# Patient Record
Sex: FEMALE | Race: WHITE | NOT HISPANIC OR LATINO | Employment: PART TIME | URBAN - METROPOLITAN AREA
[De-identification: names, ages, dates, MRNs, and addresses within clinical notes are randomized per-mention and may not be internally consistent; named-entity substitution may affect disease eponyms.]

---

## 2017-07-12 ENCOUNTER — TELEPHONE (OUTPATIENT)
Dept: FAMILY MEDICINE | Facility: CLINIC | Age: 42
End: 2017-07-12

## 2017-07-12 NOTE — TELEPHONE ENCOUNTER
[facility] phone call message- general phone call:    Reason for call: Called patient to make an appointment for BP check unable to reach patient at number provided. [facility] that [facility] also [facility].     Return call needed: Yes    OK to leave a message on voice mail? Yes    Primary language: English      needed? No    Call taken on July 12, 2017 at 9:25 AM by Jolynn Alcantar

## 2017-12-19 ENCOUNTER — TELEPHONE (OUTPATIENT)
Dept: FAMILY MEDICINE | Facility: CLINIC | Age: 42
End: 2017-12-19

## 2017-12-19 NOTE — TELEPHONE ENCOUNTER
Diabetes Panel Management: due for f/u dm and recheck BP. BP elevated on 12/13/16 at 150/96. Has not been seen since 12/2016.    Again, called patient today. Phone number is not accepting calls from Clinic number, unable to leave message. Automated phone message states to call a 1-800 # for customer services and to call from a land line. No further phone call attempt. Diabetes outreach letter mailed to patient today.    THERESA Sandhu

## 2017-12-19 NOTE — LETTER
December 19, 2017      Ms. Alla Good  [address]  [place] [facility] [address]        Dear Alla,    As you know when you have diabetes it important to visit your doctor 3 times a year for diabetes care.  You are due for a diabetes follow-up visit.  This visit is important but not urgent. Please call the appointment desk the clinic to schedule a visit with your primary doctor in the next few weeks.     When you come for your appointment please bring your medications and your blood sugar log with you so we can make the best use of the visit.      Sincerely,    Misbah Palla, MD

## 2018-01-27 ENCOUNTER — HEALTH MAINTENANCE LETTER (OUTPATIENT)
Age: 43
End: 2018-01-27

## 2018-06-24 ENCOUNTER — OFFICE VISIT (OUTPATIENT)
Dept: URGENT CARE | Facility: URGENT CARE | Age: 43
End: 2018-06-24
Payer: COMMERCIAL

## 2018-06-24 ENCOUNTER — NURSE TRIAGE (OUTPATIENT)
Dept: NURSING | Facility: CLINIC | Age: 43
End: 2018-06-24

## 2018-06-24 VITALS
TEMPERATURE: 98.4 F | OXYGEN SATURATION: 98 % | WEIGHT: 225 LBS | BODY MASS INDEX: 36.32 KG/M2 | SYSTOLIC BLOOD PRESSURE: 104 MMHG | HEART RATE: 80 BPM | DIASTOLIC BLOOD PRESSURE: 71 MMHG

## 2018-06-24 DIAGNOSIS — R11.2 INTRACTABLE VOMITING WITH NAUSEA, UNSPECIFIED VOMITING TYPE: Primary | ICD-10-CM

## 2018-06-24 PROCEDURE — 99213 OFFICE O/P EST LOW 20 MIN: CPT | Performed by: PEDIATRICS

## 2018-06-24 RX ORDER — ONDANSETRON 4 MG/1
4 TABLET, FILM COATED ORAL EVERY 8 HOURS PRN
Qty: 18 TABLET | Refills: 0 | Status: SHIPPED | OUTPATIENT
Start: 2018-06-24
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
## 2018-06-24 NOTE — TELEPHONE ENCOUNTER
Patient asking what her co-pay will be if she is seen at .  States she will have to borrow money from someone and take bus.  Crying as states she will lose job.  Transferred to backline at  for assistance.

## 2018-06-24 NOTE — MR AVS SNAPSHOT
After Visit Summary   6/24/2018    Alla Good    MRN: [id number]           Patient Information     Date Of Birth          1975        Visit Information        Provider Department      6/24/2018 3:15 PM Stephen Dalal MD [facility]        Today's Diagnoses     Intractable vomiting with nausea, unspecified vomiting type    -  1       Follow-ups after your visit        Who to contact     If you have questions or need follow up information about today's clinic visit or your schedule please contact [facility] directly at [phone].  Normal or non-critical lab and imaging results will be communicated to you by MyChart, letter or phone within 4 business days after the clinic has received the results. If you do not hear from us within 7 days, please contact the clinic through MyChart or phone. If you have a critical or abnormal lab result, we will notify you by phone as soon as possible.  Submit refill requests through [employer] or call your pharmacy and they will forward the refill request to us. Please allow 3 business days for your refill to be completed.          Additional Information About Your Visit        Care EveryWhere ID     This is your Care EveryWhere ID. This could be used by other organizations to access your [place] medical records  [id number]        Your Vitals Were     Pulse Temperature Pulse Oximetry BMI (Body Mass Index)          80 98.4  F (36.9  C) (Tympanic) 98% 36.32 kg/m2         Blood Pressure from Last 3 Encounters:   06/24/18 104/71   12/13/16 (!) 150/96   06/24/16 127/77    Weight from Last 3 Encounters:   06/24/18 225 lb (102.1 kg)   12/13/16 219 lb (99.3 kg)   06/24/16 229 lb 12.8 oz (104.2 kg)              Today, you had the following     No orders found for display         Today's Medication Changes          These changes are accurate as of 6/24/18  3:47 PM.  If you have any questions, ask your nurse or doctor.                Start taking these medicines.        Dose/Directions    ondansetron 4 MG tablet   Commonly known as:  ZOFRAN   Used for:  Intractable vomiting with nausea, unspecified vomiting type   Started by:  Stephen Dalal MD        Dose:  4 mg   Take 1 tablet (4 mg) by mouth every 8 hours as needed for nausea   Quantity:  18 tablet   Refills:  0            Where to get your medicines      These medications were sent to [id number] - [place], [place] - [address] AT [facility] [address]  [address], [place] [address]     Phone:  [phone]     ondansetron 4 MG tablet                Primary Care Provider Office Phone # Fax #    Milo Yousuf Palla, [phone]       [address] [address]        Equal Access to Services     [facility] CenterGRAYSON : Hadii bea mack hadasho Soomaali, waaxda luqadaha, qaybta kaalmada adeegyada, marguerite hernandez hayilir arriaza . So [facility] [phone].    ATENCIÓN: Si habla español, tiene a decker disposición servicios gratuitos de asistencia lingüística. [facility] [phone].    We comply with applicable federal civil rights laws and [place] laws. We do not discriminate on the basis of race, color, national origin, age, disability, sex, sexual orientation, or gender identity.            Thank you!     Thank you for choosing [facility]  for your care. Our goal is always to provide you with excellent care. Hearing back from our patients is one way we can continue to improve our services. Please take a few minutes to complete the written survey that you may receive in the mail after your visit with us. Thank you!             Your Updated Medication List - Protect others around you: Learn how to safely use, store and throw away your medicines at www.disposemymeds.org.          This list is accurate as of 6/24/18  3:47 PM.  Always use your most recent med list.                   Brand Name Dispense  Instructions for use Diagnosis    blood glucose monitoring lancets     1 Box    3Use to test blood sugar 3 times daily or as directed.    Type II or unspecified type diabetes mellitus with ophthalmic manifestations, not stated as uncontrolled(250.50) (H)       blood glucose monitoring meter device kit      Use as directed        blood glucose monitoring test strip    JACKY CONTOUR NEXT    30 strip    Use to test blood sugar 3 times daily or as directed.    Type II or unspecified type diabetes mellitus with ophthalmic manifestations, not stated as uncontrolled(250.50) (H)       docusate sodium 100 MG tablet    COLACE    60 tablet    Take 200 mg by mouth daily    Hemorrhagic ovarian cyst       doxepin 100 MG capsule    SINEquan     Take 1 capsule (100 mg) by mouth At Bedtime        ibuprofen 600 MG tablet    ADVIL/MOTRIN    90 tablet    Take 1 tablet (600 mg) by mouth every 6 hours as needed for moderate pain    Right elbow pain, Contusion of right elbow, initial encounter       KLONOPIN PO      Take 1 mg by mouth 2x a day as needed        LANCETS MISC.      Lancets for Didi glucose machine        ondansetron 4 MG tablet    ZOFRAN    18 tablet    Take 1 tablet (4 mg) by mouth every 8 hours as needed for nausea    Intractable vomiting with nausea, unspecified vomiting type       prazosin 2 MG capsule    MINIPRESS    60 capsule    Take 1 capsule (2 mg) by mouth At Bedtime        SUMAtriptan 25 MG tablet    IMITREX    9 tablet    Take 1-2 tablets (25-50 mg) by mouth at onset of headache for migraine May repeat dose in 2 hours.  Do not exceed 200 mg in 24 hours    Migraine with aura and without status migrainosus, not intractable

## 2020-11-10 ENCOUNTER — COMMUNICATION - HEALTHEAST (OUTPATIENT)
Dept: SCHEDULING | Facility: CLINIC | Age: 45
End: 2020-11-10

## 2020-11-11 ENCOUNTER — COMMUNICATION - HEALTHEAST (OUTPATIENT)
Dept: SCHEDULING | Facility: CLINIC | Age: 45
End: 2020-11-11

## 2021-05-25 ENCOUNTER — RECORDS - HEALTHEAST (OUTPATIENT)
Dept: ADMINISTRATIVE | Facility: CLINIC | Age: 46
End: 2021-05-25

## 2021-05-26 ENCOUNTER — RECORDS - HEALTHEAST (OUTPATIENT)
Dept: ADMINISTRATIVE | Facility: CLINIC | Age: 46
End: 2021-05-26

## 2021-05-28 ENCOUNTER — RECORDS - HEALTHEAST (OUTPATIENT)
Dept: ADMINISTRATIVE | Facility: CLINIC | Age: 46
End: 2021-05-28

## 2021-05-29 ENCOUNTER — RECORDS - HEALTHEAST (OUTPATIENT)
Dept: ADMINISTRATIVE | Facility: CLINIC | Age: 46
End: 2021-05-29

## 2021-05-30 ENCOUNTER — RECORDS - HEALTHEAST (OUTPATIENT)
Dept: ADMINISTRATIVE | Facility: CLINIC | Age: 46
End: 2021-05-30

## 2021-05-31 ENCOUNTER — RECORDS - HEALTHEAST (OUTPATIENT)
Dept: ADMINISTRATIVE | Facility: CLINIC | Age: 46
End: 2021-05-31

## 2021-06-02 ENCOUNTER — RECORDS - HEALTHEAST (OUTPATIENT)
Dept: ADMINISTRATIVE | Facility: CLINIC | Age: 46
End: 2021-06-02

## 2021-06-02 ENCOUNTER — HOSPITAL ENCOUNTER (EMERGENCY)
Dept: EMERGENCY MEDICINE | Facility: CLINIC | Age: 46
Discharge: HOME | End: 2021-06-02
Payer: COMMERCIAL

## 2021-06-02 DIAGNOSIS — R11.2 NAUSEA VOMITING AND DIARRHEA: ICD-10-CM

## 2021-06-02 DIAGNOSIS — R10.31 RLQ ABDOMINAL PAIN: ICD-10-CM

## 2021-06-02 DIAGNOSIS — R19.7 NAUSEA VOMITING AND DIARRHEA: ICD-10-CM

## 2021-06-02 LAB
ALBUMIN SERPL-MCNC: 3.5 G/DL (ref 3.5–5)
ALP SERPL-CCNC: 75 U/L (ref 45–120)
ALT SERPL W P-5'-P-CCNC: 16 U/L (ref 0–45)
ANION GAP SERPL CALCULATED.3IONS-SCNC: 8 MMOL/L (ref 5–18)
AST SERPL W P-5'-P-CCNC: 10 U/L (ref 0–40)
BASOPHILS # BLD AUTO: 0 THOU/UL (ref 0–0.2)
BASOPHILS NFR BLD AUTO: 1 % (ref 0–2)
BILIRUB DIRECT SERPL-MCNC: <0.1 MG/DL
BILIRUB SERPL-MCNC: 0.2 MG/DL (ref 0–1)
BUN SERPL-MCNC: 6 MG/DL (ref 8–22)
CALCIUM SERPL-MCNC: 8.7 MG/DL (ref 8.5–10.5)
CHLORIDE BLD-SCNC: 107 MMOL/L (ref 98–107)
CO2 SERPL-SCNC: 25 MMOL/L (ref 22–31)
CREAT SERPL-MCNC: 0.79 MG/DL (ref 0.6–1.1)
EOSINOPHIL # BLD AUTO: 0.2 THOU/UL (ref 0–0.4)
EOSINOPHIL NFR BLD AUTO: 3 % (ref 0–6)
ERYTHROCYTE [DISTWIDTH] IN BLOOD BY AUTOMATED COUNT: 12.7 % (ref 11–14.5)
GFR SERPL CREATININE-BSD FRML MDRD: >60 ML/MIN/1.73M2
GLUCOSE BLD-MCNC: 164 MG/DL (ref 70–125)
HCT VFR BLD AUTO: 39 % (ref 35–47)
HGB BLD-MCNC: 13.1 G/DL (ref 12–16)
IMM GRANULOCYTES # BLD: 0 THOU/UL
IMM GRANULOCYTES NFR BLD: 0 %
LIPASE SERPL-CCNC: 28 U/L (ref 0–52)
LYMPHOCYTES # BLD AUTO: 2.5 THOU/UL (ref 0.8–4.4)
LYMPHOCYTES NFR BLD AUTO: 32 % (ref 20–40)
MCH RBC QN AUTO: 29.6 PG (ref 27–34)
MCHC RBC AUTO-ENTMCNC: 33.6 G/DL (ref 32–36)
MCV RBC AUTO: 88 FL (ref 80–100)
MONOCYTES # BLD AUTO: 0.5 THOU/UL (ref 0–0.9)
MONOCYTES NFR BLD AUTO: 7 % (ref 2–10)
NEUTROPHILS # BLD AUTO: 4.6 THOU/UL (ref 2–7.7)
NEUTROPHILS NFR BLD AUTO: 58 % (ref 50–70)
PLATELET # BLD AUTO: 332 THOU/UL (ref 140–440)
PMV BLD AUTO: 8.5 FL (ref 8.5–12.5)
POC PREG URINE (HCG) HE - HISTORICAL: NEGATIVE
POCT KIT EXPIRATION DATE HE - HISTORICAL: NORMAL
POCT KIT LOT NUMBER HE - HISTORICAL: NORMAL
POCT NEGATIVE CONTROL HE - HISTORICAL: NORMAL
POCT POSITIVE CONTROL HE - HISTORICAL: NORMAL
POTASSIUM BLD-SCNC: 3.6 MMOL/L (ref 3.5–5)
PROT SERPL-MCNC: 6.6 G/DL (ref 6–8)
RBC # BLD AUTO: 4.43 MILL/UL (ref 3.8–5.4)
SODIUM SERPL-SCNC: 140 MMOL/L (ref 136–145)
WBC: 7.9 THOU/UL (ref 4–11)

## 2021-06-02 ASSESSMENT — MIFFLIN-ST. JEOR: SCORE: 1778.94

## 2021-06-07 ENCOUNTER — HOSPITAL ENCOUNTER (EMERGENCY)
Dept: EMERGENCY MEDICINE | Facility: CLINIC | Age: 46
Discharge: HOME | End: 2021-06-07
Attending: EMERGENCY MEDICINE
Payer: COMMERCIAL

## 2021-06-07 DIAGNOSIS — R10.31 RIGHT LOWER QUADRANT ABDOMINAL PAIN: ICD-10-CM

## 2021-06-07 LAB
ALBUMIN SERPL-MCNC: 4 G/DL (ref 3.5–5)
ALBUMIN UR-MCNC: ABNORMAL G/DL
ALP SERPL-CCNC: 65 U/L (ref 45–120)
ALT SERPL W P-5'-P-CCNC: 23 U/L (ref 0–45)
ANION GAP SERPL CALCULATED.3IONS-SCNC: 6 MMOL/L (ref 5–18)
APPEARANCE UR: CLEAR
AST SERPL W P-5'-P-CCNC: 20 U/L (ref 0–40)
BACTERIA #/AREA URNS HPF: ABNORMAL /[HPF]
BILIRUB DIRECT SERPL-MCNC: 0.2 MG/DL
BILIRUB SERPL-MCNC: 0.5 MG/DL (ref 0–1)
BILIRUB UR QL STRIP: NEGATIVE
BUN SERPL-MCNC: 11 MG/DL (ref 8–22)
CALCIUM SERPL-MCNC: 9.8 MG/DL (ref 8.5–10.5)
CHLORIDE BLD-SCNC: 104 MMOL/L (ref 98–107)
CO2 SERPL-SCNC: 29 MMOL/L (ref 22–31)
COLOR UR AUTO: YELLOW
CREAT SERPL-MCNC: 0.8 MG/DL (ref 0.6–1.1)
ERYTHROCYTE [DISTWIDTH] IN BLOOD BY AUTOMATED COUNT: 12.5 % (ref 11–14.5)
GFR SERPL CREATININE-BSD FRML MDRD: >60 ML/MIN/1.73M2
GLUCOSE BLD-MCNC: 88 MG/DL (ref 70–125)
GLUCOSE UR STRIP-MCNC: NEGATIVE MG/DL
HCT VFR BLD AUTO: 41.3 % (ref 35–47)
HGB BLD-MCNC: 13.8 G/DL (ref 12–16)
HGB UR QL STRIP: NEGATIVE
KETONES UR STRIP-MCNC: NEGATIVE MG/DL
LACTATE SERPL-SCNC: 1.4 MMOL/L (ref 0.7–2)
LEUKOCYTE ESTERASE UR QL STRIP: NEGATIVE
MCH RBC QN AUTO: 29.3 PG (ref 27–34)
MCHC RBC AUTO-ENTMCNC: 33.4 G/DL (ref 32–36)
MCV RBC AUTO: 88 FL (ref 80–100)
MUCOUS THREADS #/AREA URNS LPF: PRESENT LPF
NITRATE UR QL: NEGATIVE
PH UR STRIP: 6 [PH] (ref 5–8)
PLATELET # BLD AUTO: 345 THOU/UL (ref 140–440)
PMV BLD AUTO: 8.3 FL (ref 8.5–12.5)
POTASSIUM BLD-SCNC: 3.8 MMOL/L (ref 3.5–5)
PROT SERPL-MCNC: 7 G/DL (ref 6–8)
RBC # BLD AUTO: 4.71 MILL/UL (ref 3.8–5.4)
RBC URINE: 3 HPF
SODIUM SERPL-SCNC: 139 MMOL/L (ref 136–145)
SP GR UR STRIP: 1.03 (ref 1–1.03)
SQUAMOUS EPITHELIAL: 2 /HPF
UROBILINOGEN UR STRIP-ACNC: ABNORMAL
WBC URINE: 3 HPF
WBC: 7.7 THOU/UL (ref 4–11)

## 2021-06-07 ASSESSMENT — MIFFLIN-ST. JEOR: SCORE: 1851.52

## 2021-06-25 NOTE — ED TRIAGE NOTES
Patient is here with RLQ pain, she still has her appendix, she also has a hx of ovarian cyst. She also started to have emesis with diarrhea.

## 2021-06-25 NOTE — ED TRIAGE NOTES
abd pain with n/v and some intermitt diarrhea x 5 days  Seen here in ED last week for same symptoms, referred to follow up with pmd-seen by pmd today for continued rt sided abd pain and referred to ED for further workup

## 2021-06-26 NOTE — ED PROVIDER NOTES
EMERGENCY DEPARTMENT ENCOUNTER      NAME: Alla Good  AGE: 46 y.o. female  YOB: 1975  MRN: [id number]  EVALUATION DATE & TIME: 2021  1:55 PM    PCP: Coretta Griggs PA-C    ED PROVIDER: Mac Jones M.D.      Chief Complaint   Patient presents with     Abdominal Pain         FINAL IMPRESSION:  Right lower quadrant pain      ED COURSE & MEDICAL DECISION MAKIN:12 PM I met with patient for initial interview and exam. I saw the patient wearing a surgical mask and eye protection.   3:29 PM.  Laboratory evaluation is normal.  Normal white cell count, normal lactic acid.  Urine analysis with minimal trace hematuria.  Awaiting CT imaging.   4:40 PM.  CT imaging is unremarkable.  No findings to explain patient's symptomatology.  Laboratory evaluation also normal.  Given the unilateral location and burning quality the possibility of zoster without the rash considered.  Given the extent of her symptoms will treat empirically with antivirals and a prednisone taper.  Patient is agreeable.  Pertinent Labs & Imaging studies reviewed. (See chart for details)  46 y.o. female presents to the Emergency Department for evaluation of worsening abdominal pain.  Patient reports a severe achiness in the right lower quadrant.  Symptoms slowly worsening for the last 4 to 5 days.  At the conclusion of the encounter I discussed the results of all of the tests and the disposition. The questions were answered. The patient or family acknowledged understanding and was agreeable with the care plan.  Patient had been seen on  for similar symptomatology.  Laboratory evaluation and imaging was unremarkable.  Given her worsening symptoms patient contact her physician today who recommended reevaluation in the emergency room.  She also reports ongoing vomiting and diarrhea.  On exam she is an obese female in mild distress.  Signs are unremarkable.  Heart lungs are unremarkable.  Abdomen is obese, soft and hypoactive.  She  has mild right upper quadrant and marked right lower quadrant tenderness.  Primary concern is of potential right-sided diverticulitis versus enteritis versus appendicitis.  Less likely represent ovarian cystic disease given ongoing symptoms.  Labs are sent for evaluation and repeat CT imaging ordered.  Patient treated symptomatically with intravenous Zofran and Dilaudid.    Current Discharge Medication List      START taking these medications    Details   acyclovir (ZOVIRAX) 400 MG tablet Take 1 tablet (400 mg total) by mouth 4 (four) times a day for 10 days.  Qty: 50 tablet, Refills: 0    Associated Diagnoses: Right lower quadrant abdominal pain      !! HYDROcodone-acetaminophen 5-325 mg per tablet Take 1 tablet by mouth every 4 (four) hours as needed for pain.  Qty: 20 tablet, Refills: 0    Associated Diagnoses: Right lower quadrant abdominal pain      ondansetron (ZOFRAN ODT) 4 MG disintegrating tablet Take 1 tablet (4 mg total) by mouth every 8 (eight) hours as needed for nausea.  Qty: 12 tablet, Refills: 0    Associated Diagnoses: Right lower quadrant abdominal pain      predniSONE (DELTASONE) 10 mg tablet Take 4 tabs daily for 3 days, then 3 tabs daily for 3 days, then 2 tabs daily for 3 days, then 1 tab daily for 3 days, then stop..  Qty: 30 tablet, Refills: 0    Associated Diagnoses: Right lower quadrant abdominal pain       !! - Potential duplicate medications found. Please discuss with provider.        MEDICATIONS GIVEN IN THE EMERGENCY:  Medications - No data to display    NEW PRESCRIPTIONS STARTED AT TODAY'S ER VISIT  Current Discharge Medication List      CONTINUE these medications which have NOT CHANGED    Details   aspirin 81 MG EC tablet       benzonatate (TESSALON) 100 MG capsule Take 1 capsule (100 mg total) by mouth every 8 (eight) hours.  Qty: 15 capsule, Refills: 0      cetirizine-pseudoephedrine (ZYRTEC-D) 5-120 mg per tablet Take 1 tablet by mouth 2 (two) times a day.  Qty: 10 tablet, Refills:  0      clonazePAM (KLONOPIN) 1 MG tablet Take 1 mg by mouth.      doxepin (SINEQUAN) 25 MG capsule Take 75 mg by mouth.      fluticasone (FLONASE) 50 mcg/actuation nasal spray 2 sprays into each nostril daily.  Qty: 16 g, Refills: 0      HYDROcodone-acetaminophen 5-325 mg per tablet Take 1 tablet by mouth every 4 (four) hours as needed for pain.  Qty: 6 tablet, Refills: 0    Associated Diagnoses: Cyst of right ovary; Right lower quadrant abdominal pain      lisinopril (PRINIVIL,ZESTRIL) 10 MG tablet Take 10 mg by mouth.      metFORMIN (GLUCOPHAGE) 500 MG tablet Take 1,000 mg by mouth.      metoclopramide (REGLAN) 10 MG tablet Take 10 mg by mouth.      omeprazole (PRILOSEC) 40 MG capsule Take 40 mg by mouth.      ondansetron (ZOFRAN) 4 MG tablet Take 1 tablet (4 mg total) by mouth every 6 (six) hours.  Qty: 12 tablet, Refills: 0    Associated Diagnoses: Nausea vomiting and diarrhea      prazosin (MINIPRESS) 1 MG capsule Take 2 mg by mouth.      !! SUMAtriptan (IMITREX) 100 MG tablet Take 0.5 tablets (50 mg total) by mouth every 2 (two) hours as needed for migraine.  Qty: 10 tablet, Refills: 0      !! SUMAtriptan (IMITREX) 25 MG tablet        !! - Potential duplicate medications found. Please discuss with provider.             =================================================================    HPI    Patient information was obtained from: Patient    Use of : N/A         Alla Good is a 46 y.o. female with a pertinent history of depression, anxiety, panic disorder, schizoaffective disorder, obesity, high blood pressure, carotid artery stenosis, type II diabetes mellitus, and ovarian cysts  who presents to this ED via walk in for evaluation of abdominal pain.     Per chart review, patient was seen here at the [facility] ED on 6/2/21 for evaluation of RLQ abdominal pain, nausea, vomiting, and diarrhea.  UPT negative. CT negative. Working diagnosis of the abdominal pain was unclear. Possibly could be  "gastroenteritis. Pain was improved with dilaudid and zofran. Patient discharged home with encouragement of home supportive management and close follow up in clinic the following day.    Patient reports that her right lower quadrant abdominal pain has worsened over the past 2-3 days, with pain becoming significantly worse last night. Pain is worsened with movements such as ambulation and she describes pain as feeling \"on fire\". She reports that she is unable to keep anything down and endorses emesis and diarrhea. Patient denies any dysuria, urinary frequency, vaginal discharge, or additional urinary symptoms. No recent changes to diet.     Patient still has her appendix and gallbladder. She had her left ovary removed in 2015, and states that the right was not removed as it looked healthy at the time.      REVIEW OF SYSTEMS   Review of Systems   Gastrointestinal: Positive for abdominal pain (right lower quadrant), diarrhea, nausea and vomiting.   Genitourinary: Negative for dysuria, frequency, hematuria and vaginal discharge.   All other systems reviewed and are negative.      PAST MEDICAL HISTORY:  Past Medical History:   Diagnosis Date     Anxiety      Carotid artery stenosis      Depression      Diabetes mellitus, type 2 (H)      Migraine      PTSD (post-traumatic stress disorder)      Schizoaffective disorder (H)        PAST SURGICAL HISTORY:  Past Surgical History:   Procedure Laterality Date     HYSTERECTOMY         CURRENT MEDICATIONS:    No current facility-administered medications on file prior to encounter.      Current Outpatient Medications on File Prior to Encounter   Medication Sig     aspirin 81 MG EC tablet      benzonatate (TESSALON) 100 MG capsule Take 1 capsule (100 mg total) by mouth every 8 (eight) hours.     cetirizine-pseudoephedrine (ZYRTEC-D) 5-120 mg per tablet Take 1 tablet by mouth 2 (two) times a day.     clonazePAM (KLONOPIN) 1 MG tablet Take 1 mg by mouth.     doxepin (SINEQUAN) 25 MG " capsule Take 75 mg by mouth.     fluticasone (FLONASE) 50 mcg/actuation nasal spray 2 sprays into each nostril daily.     HYDROcodone-acetaminophen 5-325 mg per tablet Take 1 tablet by mouth every 4 (four) hours as needed for pain.     lisinopril (PRINIVIL,ZESTRIL) 10 MG tablet Take 10 mg by mouth.     metFORMIN (GLUCOPHAGE) 500 MG tablet Take 1,000 mg by mouth.     metoclopramide (REGLAN) 10 MG tablet Take 10 mg by mouth.     omeprazole (PRILOSEC) 40 MG capsule Take 40 mg by mouth.     ondansetron (ZOFRAN) 4 MG tablet Take 1 tablet (4 mg total) by mouth every 6 (six) hours.     prazosin (MINIPRESS) 1 MG capsule Take 2 mg by mouth.     SUMAtriptan (IMITREX) 100 MG tablet Take 0.5 tablets (50 mg total) by mouth every 2 (two) hours as needed for migraine.     SUMAtriptan (IMITREX) 25 MG tablet        ALLERGIES:  Allergies   Allergen Reactions     Demerol [Meperidine]      Lamictal [Lamotrigine]      Sulfa (Sulfonamide Antibiotics)      Codeine Itching       FAMILY HISTORY:  No family history on file.    SOCIAL HISTORY:   Social History     Socioeconomic History     Marital status: Single     Spouse name: Not on file     Number of children: Not on file     Years of education: Not on file     Highest education level: Not on file   Occupational History     Not on file   Social Needs     Financial resource strain: Not on file     Food insecurity     Worry: Not on file     Inability: Not on file     Transportation needs     Medical: Not on file     Non-medical: Not on file   Tobacco Use     Smoking status: Current Every Day Smoker   Substance and Sexual Activity     Alcohol use: No     Drug use: No     Sexual activity: Not on file   Lifestyle     Physical activity     Days per week: Not on file     Minutes per session: Not on file     Stress: Not on file   Relationships     Social connections     Talks on phone: Not on file     Gets together: Not on file     Attends Mandaeism service: Not on file     Active member of club  "or organization: Not on file     Attends meetings of clubs or organizations: Not on file     Relationship status: Not on file     Intimate partner violence     Fear of current or ex partner: Not on file     Emotionally abused: Not on file     Physically abused: Not on file     Forced sexual activity: Not on file   Other Topics Concern     Not on file   Social History Narrative     Not on file       VITALS:  Patient Vitals for the past 24 hrs:   BP Temp Temp src Pulse Resp SpO2 Height Weight   06/07/21 1415 -- -- -- 88 -- 100 % -- --   06/07/21 1144 (!) 142/104 98.2  F (36.8  C) Oral 81 20 99 % 5' 7\" (1.702 m) (!) 261 lb (118.4 kg)       PHYSICAL EXAM    GENERAL: Obese female. Awake, alert.  Moderate distress.   HEENT: Normocephalic, atraumatic.  Pupils equal, round and reactive.  Conjunctiva normal.  EOMI.  NECK: No stridor.  PULMONARY: Symmetrical breath sounds without distress.  Lungs clear to auscultation bilaterally without wheezes, rhonchi or rales.  CARDIO: Regular rate and rhythm.  No significant murmur, rub or gallop.  Radial pulses strong and symmetrical.  ABDOMINAL: Obese, Abdomen soft, hypoactive bowel sounds, mild right upper quadrant moderate to severe right lower quadrant tenderness with guarding. Non-distended  No CVAT, BL.  EXTREMITIES: No lower extremity swelling or edema.    NEURO: Alert and oriented to person, place and time.  Cranial nerves grossly intact.  No focal motor deficit.  PSYCH: Normal mood and affect  SKIN: No rashes      LAB:  All pertinent labs reviewed and interpreted.  Results for orders placed or performed during the hospital encounter of 06/07/21   Urinalysis-UC if Indicated   Result Value Ref Range    Color, UA Yellow Light Yellow, Yellow    Clarity, UA Clear Clear    Glucose, UA Negative Negative    Protein, UA 10 mg/dL Negative    Bilirubin, UA Negative Negative    Urobilinogen, UA <2.0 mg/dL <2.0 mg/dL    pH, UA 6.0 5.0 - 8.0    Blood, UA Negative Negative    Ketones, UA " Negative Negative    Nitrite, UA Negative Negative    Leukocytes, UA Negative Negative    Specific Gravity, UA 1.027 1.001 - 1.030    RBC, UA 3 (H) <=2 hpf    WBC UA 3 <=5 hpf    Bacteria, UA Few (!) None Seen    Squamous Epithel, UA 2 <=5 /HPF    Mucus, UA Present (!) None Seen lpf   Basic Metabolic Panel   Result Value Ref Range    Sodium 139 136 - 145 mmol/L    Potassium 3.8 3.5 - 5.0 mmol/L    Chloride 104 98 - 107 mmol/L    CO2 29 22 - 31 mmol/L    Anion Gap, Calculation 6 5 - 18 mmol/L    Glucose 88 70 - 125 mg/dL    Calcium 9.8 8.5 - 10.5 mg/dL    BUN 11 8 - 22 mg/dL    Creatinine 0.80 0.60 - 1.10 mg/dL    GFR MDRD Af Amer >60 >60 mL/min/1.73m2    GFR MDRD Non Af Amer >60 >60 mL/min/1.73m2   Hepatic Profile   Result Value Ref Range    Bilirubin, Total 0.5 0.0 - 1.0 mg/dL    Bilirubin, Direct 0.2 <=0.5 mg/dL    Protein, Total 7.0 6.0 - 8.0 g/dL    Albumin 4.0 3.5 - 5.0 g/dL    Alkaline Phosphatase 65 45 - 120 U/L    AST 20 0 - 40 U/L    ALT 23 0 - 45 U/L   Lactic Acid   Result Value Ref Range    Lactic Acid 1.4 0.7 - 2.0 mmol/L   HM2 (CBC W/O DIFF)   Result Value Ref Range    WBC 7.7 4.0 - 11.0 thou/uL    RBC 4.71 3.80 - 5.40 mill/uL    Hemoglobin 13.8 12.0 - 16.0 g/dL    Hematocrit 41.3 35.0 - 47.0 %    MCV 88 80 - 100 fL    MCH 29.3 27.0 - 34.0 pg    MCHC 33.4 32.0 - 36.0 g/dL    RDW 12.5 11.0 - 14.5 %    Platelets 345 140 - 440 thou/uL    MPV 8.3 (L) 8.5 - 12.5 fL       RADIOLOGY:  Reviewed all pertinent imaging. Please see official radiology report.  Ct Abdomen Pelvis Without Oral Without Iv Contrast    Result Date: 6/7/2021  EXAM: CT ABDOMEN PELVIS WO ORAL WO IV CONTRAST LOCATION: [facility] DATE/TIME: 6/7/2021 3:47 PM INDICATION: Worsening right lower quadrant tenderness COMPARISON: 6/2/2021 and 3/17/2020 CT TECHNIQUE: CT scan of the abdomen and pelvis was performed without oral or IV contrast. Multiplanar reformats were obtained. Dose reduction techniques were used. CONTRAST:  None. FINDINGS: LOWER CHEST: Normal. HEPATOBILIARY: Normal. PANCREAS: Normal. SPLEEN: Normal. ADRENAL GLANDS: Normal. KIDNEY/BLADDER: Right mid renal wedge-shaped cortical defect with a 1 mm calcification at its base could represent a nonobstructive stone. No ureteral stone or hydronephrosis. BOWEL: Normal-appearing colon and small bowel. Appendix not visualized. LYMPH NODES: Normal. VASCULATURE: Unremarkable. PELVIC ORGANS: Absent uterus. MUSCULOSKELETAL: Stable appearing sclerotic lesions both iliac bones, left sacrum, and both symphysis. These should be benign bone islands.     1.  Stable right renal 1 mm calcification at the base of a renal cortical scar. 2.  No right ureteral stone or hydronephrosis. 3.  No right abdominal pain etiology found.          I, Marisa Sim, am serving as a scribe to document services personally performed by Dr. Mac Jones MD, based on my observation and the provider's statements to me. I, Mac Jones MD attest that Marisa Sim is acting in a scribe capacity, has observed my performance of the services and has documented them in accordance with my direction.    Mac Jones M.D.  Emergency Medicine  [facility] EMERGENCY ROOM  [address] [address]  Dept: [phone]  Loc: [phone]       Mac Jones MD  06/07/21 [id number]

## 2021-06-26 NOTE — ED PROVIDER NOTES
EMERGENCY DEPARTMENT ENCOUNTER      NAME: Alla Good  AGE: 46 y.o. female  YOB: 1975  MRN: [id number]  EVALUATION DATE & TIME: 2021  7:25 PM    PCP: Coretta Griggs PA-C    ED PROVIDER: Tiffanie Hoffman PA-C      Chief Complaint   Patient presents with     Abdominal Pain         FINAL IMPRESSION:  1. RLQ abdominal pain    2. Nausea vomiting and diarrhea          ED COURSE & MEDICAL DECISION MAKIN:38 PM I met with the patient for the initial interview and physical examination. Discussed plan for treatment and workup in the ED.    9:15 PM I updated patient with lab and imaging results.   10:33 PM I checked in with patient. Discussed plan for discharge.    Alla is a 46 year old female with PMH ovarian cysts s/p left oopherectomy who presents to the ED for evaluation of RLQ abdominal pain, nausea, vomiting, and diarrhea. No fevers, dysuria, vaginal bleeding, or vaginal discharge. Patient still has her appendix and gallbladder. She has tried ibuprofen and tylenol without relief.     MEDICAL DECISION MAKING: I was concerned about this patient's LOWER ABDOMINAL pain and considered multiple causes including but not limited to the following.          Intestinal Ischemia:  Patient does not have significant risk factors for intestinal ischemia.  Pain is not out of proportion to exam findings.    Transverse colitis / Diverticulitis: Patient does have diarrhea, No fevers or leukocytosis. CT is negative..     Appendicitis: Patient does have RLQ TTP.  No fever, leukocytosis, and CT negative.    Pyelonephritis: Urine is not consistent with infection    Ureterolithiasis/Renal Colic:  Urine does not show blood. No hydronephrosis or ureterolithiasis on CT.     Bowel Obstruction:  Patient is passing gas. Bowel sounds are normal. CT is negative.    Ectopic Pregnancy: Patient is a 46 y.o. female.  UPT is negative    Ovarian cyst/mass with Torsion: Patient is a 46 y.o. female.  Does have a history of cysts.  Ultrasound with no evidence of right ovarian cyst and normal flow demonstrated, low suspicion for torsion.     PID, Tuboovarian abscess:  No new vaginal discharge.      Others benign causes including: IBS, menstrual cramps, ovarian cyst rupture, intestinal colic / gas pains, etc      IMPRESSION/DISPOSITION: Based on this patient's history, exam, and diagnostic results, the working diagnosis of this patient's RLQ abdominal pain is unclear. Could be gastroenteritis vs ruptured ovarian cyst (although no notable free fluid on ultrasound). Work-up is reassuring. Pain is improved with dilaudid and zofran. Discussed results with patient. Encouraged at home supportive management and close follow up in clinic tomorrow for re-check. Instructed on red flags/indications to return to the emergency department. All questions were answered to the best of my ability and patient is agreeable with plan.             MEDICATIONS GIVEN IN THE EMERGENCY:  Medications   HYDROmorphone injection 0.5 mg (DILAUDID) (0.5 mg Intravenous Given 6/2/21 1952)   ondansetron injection 4 mg (ZOFRAN) (4 mg Intravenous Given 6/2/21 1949)   iopamidol solution 100 mL (ISOVUE-370) (100 mL Intravenous Given 6/2/21 2046)   HYDROmorphone injection 0.5 mg (DILAUDID) (0.5 mg Intravenous Given 6/2/21 2109)   ondansetron injection 4 mg (ZOFRAN) (4 mg Intravenous Given 6/2/21 2108)       NEW PRESCRIPTIONS STARTED AT TODAY'S ER VISIT  Discharge Medication List as of 6/2/2021 10:49 PM      CONTINUE these medications which have CHANGED    Details   ondansetron (ZOFRAN) 4 MG tablet Take 1 tablet (4 mg total) by mouth every 6 (six) hours., Starting Wed 6/2/2021, Print         CONTINUE these medications which have NOT CHANGED    Details   aspirin 81 MG EC tablet Starting 1/20/2015, Until Discontinued, Historical Med      benzonatate (TESSALON) 100 MG capsule Take 1 capsule (100 mg total) by mouth every 8 (eight) hours., Starting Sat 12/15/2018, Print       cetirizine-pseudoephedrine (ZYRTEC-D) 5-120 mg per tablet Take 1 tablet by mouth 2 (two) times a day., Starting Sat 12/15/2018, Print      clonazePAM (KLONOPIN) 1 MG tablet Take 1 mg by mouth., Until Discontinued, Historical Med      doxepin (SINEQUAN) 25 MG capsule Take 75 mg by mouth., Until Discontinued, Historical Med      fluticasone (FLONASE) 50 mcg/actuation nasal spray 2 sprays into each nostril daily., Starting Sat 12/15/2018, Print      HYDROcodone-acetaminophen 5-325 mg per tablet Take 1 tablet by mouth every 4 (four) hours as needed for pain., Starting Sat 3/14/2020, Print      lisinopril (PRINIVIL,ZESTRIL) 10 MG tablet Take 10 mg by mouth., Until Discontinued, Historical Med      metFORMIN (GLUCOPHAGE) 500 MG tablet Take 1,000 mg by mouth., Starting 3/22/2011, Until Discontinued, Historical Med      metoclopramide (REGLAN) 10 MG tablet Take 10 mg by mouth., Starting 12/9/2015, Until Discontinued, Historical Med      omeprazole (PRILOSEC) 40 MG capsule Take 40 mg by mouth., Until Discontinued, Historical Med      prazosin (MINIPRESS) 1 MG capsule Take 2 mg by mouth., Until Discontinued, Historical Med      !! SUMAtriptan (IMITREX) 100 MG tablet Take 0.5 tablets (50 mg total) by mouth every 2 (two) hours as needed for migraine., Starting 10/8/2016, Until Discontinued, Print      !! SUMAtriptan (IMITREX) 25 MG tablet Starting 2/17/2015, Until Discontinued, Historical Med       !! - Potential duplicate medications found. Please discuss with provider.             =================================================================    HPI    Patient information was obtained from: patient    Use of Intrepreter: N/A         Alla Good is a 46 y.o. female with a pertinent history of depression, anxiety, panic disorder, schizoaffective disorder, obesity, high blood pressure, carotid artery stenosis, type II diabetes mellitus, and ovarian cysts who presents to this ED via walk in for evaluation of lower right  abdominal pain.    Per chart review, patient was seen at [facility] Emergency Department on 11/8/20 for the evaluation of nausea, vomiting, and diarrhea. Patient was treated with ondansetron.     Per chart review, patient was seen at [facility] Emergency Department via EMS on 9/21/20 for the evaluation of weakness. Patient was treated with cephalexin for UTI.     Yesterday (6/1/21), the patient had a sudden onset of persistent right lower abdominal pain and since then her pain has worsened. The patient typically takes ibuprofen and tylenol for her ovarian cysts, and she has been taking these without relief. She reports diarrhea, nausea, and vomiting. The patient denies any urinary symptoms, vaginal bleeding, or vaginal discharge.    The patient has a history of ovarian cysts and reports that the pain is similar but worse and the vomiting and diarrhea is not concurrent with past symptoms. She notes one episode of ovarian torsion. She reports that her right ovary, appendix, and gallbladder are in place.     The patent noted to be fully vaccinated against COVID-19.    REVIEW OF SYSTEMS   Review of Systems   Gastrointestinal: Positive for abdominal pain (right lower quadrant), diarrhea, nausea and vomiting.   Genitourinary: Negative for difficulty urinating, dyspareunia, dysuria, enuresis, vaginal bleeding and vaginal discharge.   All other systems reviewed and are negative.         PAST MEDICAL HISTORY:  Past Medical History:   Diagnosis Date     Anxiety      Carotid artery stenosis      Depression      Diabetes mellitus, type 2 (H)      Migraine      PTSD (post-traumatic stress disorder)      Schizoaffective disorder (H)        PAST SURGICAL HISTORY:  Past Surgical History:   Procedure Laterality Date     HYSTERECTOMY             CURRENT MEDICATIONS:    No current facility-administered medications on file prior to encounter.      Current Outpatient Medications on File Prior to Encounter   Medication  Sig     aspirin 81 MG EC tablet      benzonatate (TESSALON) 100 MG capsule Take 1 capsule (100 mg total) by mouth every 8 (eight) hours.     cetirizine-pseudoephedrine (ZYRTEC-D) 5-120 mg per tablet Take 1 tablet by mouth 2 (two) times a day.     clonazePAM (KLONOPIN) 1 MG tablet Take 1 mg by mouth.     doxepin (SINEQUAN) 25 MG capsule Take 75 mg by mouth.     fluticasone (FLONASE) 50 mcg/actuation nasal spray 2 sprays into each nostril daily.     HYDROcodone-acetaminophen 5-325 mg per tablet Take 1 tablet by mouth every 4 (four) hours as needed for pain.     lisinopril (PRINIVIL,ZESTRIL) 10 MG tablet Take 10 mg by mouth.     metFORMIN (GLUCOPHAGE) 500 MG tablet Take 1,000 mg by mouth.     metoclopramide (REGLAN) 10 MG tablet Take 10 mg by mouth.     omeprazole (PRILOSEC) 40 MG capsule Take 40 mg by mouth.     prazosin (MINIPRESS) 1 MG capsule Take 2 mg by mouth.     SUMAtriptan (IMITREX) 100 MG tablet Take 0.5 tablets (50 mg total) by mouth every 2 (two) hours as needed for migraine.     SUMAtriptan (IMITREX) 25 MG tablet      [DISCONTINUED] ondansetron (ZOFRAN) 4 MG tablet Take 1-2 tablets (4-8 mg total) by mouth every 8 (eight) hours as needed for nausea.       ALLERGIES:  Allergies   Allergen Reactions     Demerol [Meperidine]      Lamictal [Lamotrigine]      Sulfa (Sulfonamide Antibiotics)      Codeine Itching       FAMILY HISTORY:  No family history on file.    SOCIAL HISTORY:   Social History     Socioeconomic History     Marital status: Single     Spouse name: Not on file     Number of children: Not on file     Years of education: Not on file     Highest education level: Not on file   Occupational History     Not on file   Social Needs     Financial resource strain: Not on file     Food insecurity     Worry: Not on file     Inability: Not on file     Transportation needs     Medical: Not on file     Non-medical: Not on file   Tobacco Use     Smoking status: Current Every Day Smoker   Substance and Sexual  "Activity     Alcohol use: No     Drug use: No     Sexual activity: Not on file   Lifestyle     Physical activity     Days per week: Not on file     Minutes per session: Not on file     Stress: Not on file   Relationships     Social connections     Talks on phone: Not on file     Gets together: Not on file     Attends Sabianist service: Not on file     Active member of club or organization: Not on file     Attends meetings of clubs or organizations: Not on file     Relationship status: Not on file     Intimate partner violence     Fear of current or ex partner: Not on file     Emotionally abused: Not on file     Physically abused: Not on file     Forced sexual activity: Not on file   Other Topics Concern     Not on file   Social History Narrative     Not on file       VITALS:  Patient Vitals for the past 24 hrs:   BP Temp Temp src Pulse Resp SpO2 Height Weight   06/02/21 2115 138/61 -- -- 75 -- 99 % -- --   06/02/21 2105 147/67 -- -- 75 -- 99 % -- --   06/02/21 2031 180/90 -- -- 79 -- 98 % -- --   06/02/21 2015 159/70 -- -- 78 -- 98 % -- --   06/02/21 1948 149/83 -- -- 82 -- 99 % -- --   06/02/21 1923 162/79 (!) 96  F (35.6  C) Temporal 72 18 98 % 5' 7\" (1.702 m) (!) 245 lb (111.1 kg)       PHYSICAL EXAM    Constitutional:  Well developed, well nourished, no acute distress  EYES: Conjunctivae clear  HENT:  Atraumatic, normocephalic. External ears normal, nose normal, oropharynx moist and clear. Neck- supple   Respiratory:  No respiratory distress, normal breath sounds, no rales, no wheezing   Cardiovascular:  Normal rate, normal rhythm, no murmurs.   GI:  Soft, nondistended, right lower quandrant tenderness, no rebounding or guarding  : No CVA tenderness  Musculoskeletal:  No deformities. Moves all extremities equally.  Integument: Warm, Dry, No erythema, No rash.   Neurologic:  Alert & oriented x 3, no focal deficits noted, ambulatory   Psych: Affect normal, Judgment normal, Mood normal.       LAB:  All pertinent " labs reviewed and interpreted.  Results for orders placed or performed during the hospital encounter of 06/02/21   Basic Metabolic Panel   Result Value Ref Range    Sodium 140 136 - 145 mmol/L    Potassium 3.6 3.5 - 5.0 mmol/L    Chloride 107 98 - 107 mmol/L    CO2 25 22 - 31 mmol/L    Anion Gap, Calculation 8 5 - 18 mmol/L    Glucose 164 (H) 70 - 125 mg/dL    Calcium 8.7 8.5 - 10.5 mg/dL    BUN 6 (L) 8 - 22 mg/dL    Creatinine 0.79 0.60 - 1.10 mg/dL    GFR MDRD Af Amer >60 >60 mL/min/1.73m2    GFR MDRD Non Af Amer >60 >60 mL/min/1.73m2   Hepatic Profile   Result Value Ref Range    Bilirubin, Total 0.2 0.0 - 1.0 mg/dL    Bilirubin, Direct <0.1 <=0.5 mg/dL    Protein, Total 6.6 6.0 - 8.0 g/dL    Albumin 3.5 3.5 - 5.0 g/dL    Alkaline Phosphatase 75 45 - 120 U/L    AST 10 0 - 40 U/L    ALT 16 0 - 45 U/L   Lipase   Result Value Ref Range    Lipase 28 0 - 52 U/L   Urinalysis-UC if Indicated   Result Value Ref Range    Color, UA Yellow Light Yellow, Yellow    Clarity, UA Clear Clear    Glucose, UA Negative Negative    Protein, UA 20 mg/dL Negative    Bilirubin, UA Negative Negative    Urobilinogen, UA <2.0 mg/dL <2.0 mg/dL    pH, UA 6.5 5.0 - 8.0    Blood, UA Negative Negative    Ketones, UA Negative Negative    Nitrite, UA Negative Negative    Leukocytes, UA Negative Negative    Specific Gravity, UA 1.032 (H) 1.001 - 1.030    RBC, UA 1 <=2 hpf    WBC UA 3 <=5 hpf    Bacteria, UA None Seen None Seen    Squamous Epithel, UA 4 <=5 /HPF    Mucus, UA Present (!) None Seen lpf    Hyaline Casts, UA 3 <=5 lpf   HM1 (CBC with Diff)   Result Value Ref Range    WBC 7.9 4.0 - 11.0 thou/uL    RBC 4.43 3.80 - 5.40 mill/uL    Hemoglobin 13.1 12.0 - 16.0 g/dL    Hematocrit 39.0 35.0 - 47.0 %    MCV 88 80 - 100 fL    MCH 29.6 27.0 - 34.0 pg    MCHC 33.6 32.0 - 36.0 g/dL    RDW 12.7 11.0 - 14.5 %    Platelets 332 140 - 440 thou/uL    MPV 8.5 8.5 - 12.5 fL    Neutrophils % 58 50 - 70 %    Lymphocytes % 32 20 - 40 %    Monocytes % 7 2 - 10  %    Eosinophils % 3 0 - 6 %    Basophils % 1 0 - 2 %    Immature Granulocyte % 0 <=0 %    Neutrophils Absolute 4.6 2.0 - 7.7 thou/uL    Lymphocytes Absolute 2.5 0.8 - 4.4 thou/uL    Monocytes Absolute 0.5 0.0 - 0.9 thou/uL    Eosinophils Absolute 0.2 0.0 - 0.4 thou/uL    Basophils Absolute 0.0 0.0 - 0.2 thou/uL    Immature Granulocyte Absolute 0.0 <=0.0 thou/uL   POCT pregnancy, urine   Result Value Ref Range    POC Preg, Urine Negative Negative    POCT Kit Lot Number [id number]     POCT Kit Expiration Date 12/2022     Pos Control Valid Control Valid Control    Neg Control Valid Control Valid Control       RADIOLOGY:  Reviewed all pertinent imaging. Please see official radiology report.  Us Pelvis With Transvaginal Non Ob    Result Date: 6/2/2021  EXAM: US PELVIS WITH TRANSVAGINAL NON OB LOCATION: [facility] DATE/TIME: 6/2/2021 9:38 PM INDICATION: RLQ abdominal pain, history of cysts COMPARISON: CT 06/02/2021 TECHNIQUE: Transabdominal scans were performed. Endovaginal ultrasound was performed to better visualize the adnexa. FINDINGS: UTERUS: Surgically absent RIGHT OVARY: 3.4 x 1.7 x 2.3 cm. Normal flow demonstrated. There are couple of normal-appearing follicles. No dominant cyst. LEFT OVARY: Not identified possibly surgically absent. No significant free fluid.     1.  Negative pelvic ultrasound.     Ct Abdomen Pelvis Without Oral With Iv Contrast    Result Date: 6/2/2021  EXAM: CT ABDOMEN PELVIS WO ORAL W IV CONTRAST LOCATION: [facility] DATE/TIME: 6/2/2021 8:54 PM INDICATION: Abdominal pain, acute, nonlocalized RLQ abdominal pain COMPARISON: None. TECHNIQUE: CT scan of the abdomen and pelvis was performed following injection of IV contrast. Multiplanar reformats were obtained. Dose reduction techniques were used. CONTRAST: Iopamidol (Isovue-370) 100mL FINDINGS: LOWER CHEST: Normal. HEPATOBILIARY: Normal. PANCREAS: Normal. SPLEEN: Normal. ADRENAL GLANDS: Normal.  KIDNEYS/BLADDER: Solitary 1 mm nonobstructing stone mid right kidney. No hydronephrosis. Mild parenchymal scarring right kidney likely from previous infection. BOWEL: Normal. LYMPH NODES: Normal. VASCULATURE: Unremarkable. PELVIC ORGANS: Postop change. Otherwise negative. MUSCULOSKELETAL: Benign bone islands in the pelvis unchanged.     1.  No acute findings to explain the patient's pain. 2.  Tiny 1 mm nonobstructing stone mid right kidney.      EKG:    None    PROCEDURES:   None      I, Leann Russ, am serving as a scribe to document services personally performed by Tiffanie Hoffman PA-C based on my observation and the provider's statements to me. I, Tiffanie Hoffman PA-C attest that Leann Russ is acting in a scribe capacity, has observed my performance of the services and has documented them in accordance with my direction.    Tiffanie Hoffman PA-C  Emergency Medicine  [facility] EMERGENCY ROOM  [address] [facility] [address]  Dept: [phone]  Loc: [phone]      Tiffanie Hoffman PA-C  06/02/21 0820

## 2021-07-06 VITALS — WEIGHT: 245 LBS | BODY MASS INDEX: 38.45 KG/M2 | HEIGHT: 67 IN

## 2021-07-06 VITALS — HEIGHT: 67 IN | BODY MASS INDEX: 40.97 KG/M2 | WEIGHT: 261 LBS

## 2021-08-25 ENCOUNTER — HOSPITAL ENCOUNTER (EMERGENCY)
Facility: CLINIC | Age: 46
Discharge: HOME | End: 2021-08-26
Attending: STUDENT IN AN ORGANIZED HEALTH CARE EDUCATION/TRAINING PROGRAM | Admitting: STUDENT IN AN ORGANIZED HEALTH CARE EDUCATION/TRAINING PROGRAM
Payer: COMMERCIAL

## 2021-08-25 DIAGNOSIS — R11.2 NAUSEA AND VOMITING, INTRACTABILITY OF VOMITING NOT SPECIFIED, UNSPECIFIED VOMITING TYPE: ICD-10-CM

## 2021-08-25 DIAGNOSIS — N83.8 MASS OF RIGHT OVARY: ICD-10-CM

## 2021-08-25 LAB
ALBUMIN SERPL-MCNC: 3.7 G/DL (ref 3.5–5)
ALP SERPL-CCNC: 69 U/L (ref 45–120)
ALT SERPL W P-5'-P-CCNC: 17 U/L (ref 0–45)
ANION GAP SERPL CALCULATED.3IONS-SCNC: 9 MMOL/L (ref 5–18)
AST SERPL W P-5'-P-CCNC: 10 U/L (ref 0–40)
BASOPHILS # BLD AUTO: 0 10E3/UL (ref 0–0.2)
BASOPHILS NFR BLD AUTO: 1 %
BILIRUB DIRECT SERPL-MCNC: <0.1 MG/DL
BILIRUB SERPL-MCNC: 0.2 MG/DL (ref 0–1)
BUN SERPL-MCNC: 9 MG/DL (ref 8–22)
CALCIUM SERPL-MCNC: 9.8 MG/DL (ref 8.5–10.5)
CHLORIDE BLD-SCNC: 102 MMOL/L (ref 98–107)
CO2 SERPL-SCNC: 27 MMOL/L (ref 22–31)
CREAT SERPL-MCNC: 0.8 MG/DL (ref 0.6–1.1)
EOSINOPHIL # BLD AUTO: 0.1 10E3/UL (ref 0–0.7)
EOSINOPHIL NFR BLD AUTO: 2 %
ERYTHROCYTE [DISTWIDTH] IN BLOOD BY AUTOMATED COUNT: 12.6 % (ref 10–15)
GFR SERPL CREATININE-BSD FRML MDRD: 88 ML/MIN/1.73M2
GLUCOSE BLD-MCNC: 167 MG/DL (ref 70–125)
HCT VFR BLD AUTO: 39 % (ref 35–47)
HGB BLD-MCNC: 13.1 G/DL (ref 11.7–15.7)
IMM GRANULOCYTES # BLD: 0.1 10E3/UL
IMM GRANULOCYTES NFR BLD: 1 %
LIPASE SERPL-CCNC: 12 U/L (ref 0–52)
LYMPHOCYTES # BLD AUTO: 2 10E3/UL (ref 0.8–5.3)
LYMPHOCYTES NFR BLD AUTO: 24 %
MCH RBC QN AUTO: 29.2 PG (ref 26.5–33)
MCHC RBC AUTO-ENTMCNC: 33.6 G/DL (ref 31.5–36.5)
MCV RBC AUTO: 87 FL (ref 78–100)
MONOCYTES # BLD AUTO: 0.5 10E3/UL (ref 0–1.3)
MONOCYTES NFR BLD AUTO: 6 %
NEUTROPHILS # BLD AUTO: 5.6 10E3/UL (ref 1.6–8.3)
NEUTROPHILS NFR BLD AUTO: 66 %
NRBC # BLD AUTO: 0 10E3/UL
NRBC BLD AUTO-RTO: 0 /100
PLATELET # BLD AUTO: 335 10E3/UL (ref 150–450)
POTASSIUM BLD-SCNC: 4 MMOL/L (ref 3.5–5)
PROT SERPL-MCNC: 7.1 G/DL (ref 6–8)
RBC # BLD AUTO: 4.48 10E6/UL (ref 3.8–5.2)
SODIUM SERPL-SCNC: 138 MMOL/L (ref 136–145)
WBC # BLD AUTO: 8.4 10E3/UL (ref 4–11)

## 2021-08-25 PROCEDURE — 96375 TX/PRO/DX INJ NEW DRUG ADDON: CPT

## 2021-08-25 PROCEDURE — 85025 COMPLETE CBC W/AUTO DIFF WBC: CPT | Performed by: STUDENT IN AN ORGANIZED HEALTH CARE EDUCATION/TRAINING PROGRAM

## 2021-08-25 PROCEDURE — 99285 EMERGENCY DEPT VISIT HI MDM: CPT | Mod: 25

## 2021-08-25 PROCEDURE — 82310 ASSAY OF CALCIUM: CPT | Performed by: EMERGENCY MEDICINE

## 2021-08-25 PROCEDURE — 82248 BILIRUBIN DIRECT: CPT | Performed by: EMERGENCY MEDICINE

## 2021-08-25 PROCEDURE — 96374 THER/PROPH/DIAG INJ IV PUSH: CPT | Mod: 59

## 2021-08-25 PROCEDURE — 85004 AUTOMATED DIFF WBC COUNT: CPT | Performed by: EMERGENCY MEDICINE

## 2021-08-25 PROCEDURE — 250N000011 HC RX IP 250 OP 636: Performed by: EMERGENCY MEDICINE

## 2021-08-25 PROCEDURE — 258N000003 HC RX IP 258 OP 636: Performed by: EMERGENCY MEDICINE

## 2021-08-25 PROCEDURE — 83690 ASSAY OF LIPASE: CPT | Performed by: EMERGENCY MEDICINE

## 2021-08-25 PROCEDURE — 83690 ASSAY OF LIPASE: CPT | Performed by: STUDENT IN AN ORGANIZED HEALTH CARE EDUCATION/TRAINING PROGRAM

## 2021-08-25 PROCEDURE — C9113 INJ PANTOPRAZOLE SODIUM, VIA: HCPCS | Performed by: EMERGENCY MEDICINE

## 2021-08-25 PROCEDURE — 82248 BILIRUBIN DIRECT: CPT | Performed by: STUDENT IN AN ORGANIZED HEALTH CARE EDUCATION/TRAINING PROGRAM

## 2021-08-25 PROCEDURE — 96361 HYDRATE IV INFUSION ADD-ON: CPT

## 2021-08-25 PROCEDURE — 36415 COLL VENOUS BLD VENIPUNCTURE: CPT | Performed by: EMERGENCY MEDICINE

## 2021-08-25 RX ORDER — ONDANSETRON 4 MG/1
4 TABLET, ORALLY DISINTEGRATING ORAL ONCE
Status: COMPLETED | OUTPATIENT
Start: 2021-08-25 | End: 2021-08-25

## 2021-08-25 RX ADMIN — PANTOPRAZOLE SODIUM 40 MG: 40 INJECTION, POWDER, FOR SOLUTION INTRAVENOUS at 21:28

## 2021-08-25 RX ADMIN — ONDANSETRON 4 MG: 4 TABLET, ORALLY DISINTEGRATING ORAL at 18:53

## 2021-08-25 RX ADMIN — SODIUM CHLORIDE 1000 ML: 9 INJECTION, SOLUTION INTRAVENOUS at 18:59

## 2021-08-25 ASSESSMENT — MIFFLIN-ST. JEOR: SCORE: 1801.62

## 2021-08-25 NOTE — Clinical Note
Alla Good was seen and treated in our emergency department on 8/25/2021.  She may return to work on 08/27/2021.       If you have any questions or concerns, please don't hesitate to call.      Marques James MD

## 2021-08-25 NOTE — ED TRIAGE NOTES
"The patient presents to the ED with c/o hematemesis that started today around 1400. Patient has been having \"stomach issues\". Had a endoscopy with [facility] earlier this month that showed \"a nasty case of GERD\". Feeling nauseated. Denies any ETOH use.  "

## 2021-08-26 ENCOUNTER — APPOINTMENT (OUTPATIENT)
Dept: ULTRASOUND IMAGING | Facility: CLINIC | Age: 46
End: 2021-08-26
Attending: STUDENT IN AN ORGANIZED HEALTH CARE EDUCATION/TRAINING PROGRAM
Payer: COMMERCIAL

## 2021-08-26 ENCOUNTER — APPOINTMENT (OUTPATIENT)
Dept: CT IMAGING | Facility: CLINIC | Age: 46
End: 2021-08-26
Attending: STUDENT IN AN ORGANIZED HEALTH CARE EDUCATION/TRAINING PROGRAM
Payer: COMMERCIAL

## 2021-08-26 VITALS
SYSTOLIC BLOOD PRESSURE: 115 MMHG | OXYGEN SATURATION: 98 % | RESPIRATION RATE: 20 BRPM | HEIGHT: 67 IN | DIASTOLIC BLOOD PRESSURE: 61 MMHG | WEIGHT: 250 LBS | HEART RATE: 84 BPM | TEMPERATURE: 98.3 F | BODY MASS INDEX: 39.24 KG/M2

## 2021-08-26 PROCEDURE — 250N000013 HC RX MED GY IP 250 OP 250 PS 637: Performed by: STUDENT IN AN ORGANIZED HEALTH CARE EDUCATION/TRAINING PROGRAM

## 2021-08-26 PROCEDURE — 76830 TRANSVAGINAL US NON-OB: CPT

## 2021-08-26 PROCEDURE — 250N000011 HC RX IP 250 OP 636: Performed by: STUDENT IN AN ORGANIZED HEALTH CARE EDUCATION/TRAINING PROGRAM

## 2021-08-26 PROCEDURE — 74177 CT ABD & PELVIS W/CONTRAST: CPT

## 2021-08-26 PROCEDURE — 258N000003 HC RX IP 258 OP 636: Performed by: STUDENT IN AN ORGANIZED HEALTH CARE EDUCATION/TRAINING PROGRAM

## 2021-08-26 RX ORDER — MORPHINE SULFATE 4 MG/ML
4 INJECTION, SOLUTION INTRAMUSCULAR; INTRAVENOUS ONCE
Status: COMPLETED | OUTPATIENT
Start: 2021-08-26 | End: 2021-08-26

## 2021-08-26 RX ORDER — ONDANSETRON 4 MG/1
4 TABLET, ORALLY DISINTEGRATING ORAL EVERY 6 HOURS PRN
Qty: 8 TABLET | Refills: 0 | Status: SHIPPED | OUTPATIENT
Start: 2021-08-26

## 2021-08-26 RX ORDER — PROCHLORPERAZINE MALEATE 10 MG
10 TABLET ORAL ONCE
Status: COMPLETED | OUTPATIENT
Start: 2021-08-26 | End: 2021-08-26

## 2021-08-26 RX ORDER — IOPAMIDOL 755 MG/ML
100 INJECTION, SOLUTION INTRAVASCULAR ONCE
Status: COMPLETED | OUTPATIENT
Start: 2021-08-26 | End: 2021-08-26

## 2021-08-26 RX ORDER — DIPHENHYDRAMINE HYDROCHLORIDE 50 MG/ML
25 INJECTION INTRAMUSCULAR; INTRAVENOUS ONCE
Status: COMPLETED | OUTPATIENT
Start: 2021-08-26 | End: 2021-08-26

## 2021-08-26 RX ORDER — METOCLOPRAMIDE HYDROCHLORIDE 5 MG/ML
10 INJECTION INTRAMUSCULAR; INTRAVENOUS ONCE
Status: COMPLETED | OUTPATIENT
Start: 2021-08-26 | End: 2021-08-26

## 2021-08-26 RX ADMIN — SODIUM CHLORIDE, POTASSIUM CHLORIDE, SODIUM LACTATE AND CALCIUM CHLORIDE 2000 ML: 600; 310; 30; 20 INJECTION, SOLUTION INTRAVENOUS at 00:07

## 2021-08-26 RX ADMIN — IOPAMIDOL 100 ML: 755 INJECTION, SOLUTION INTRAVENOUS at 00:34

## 2021-08-26 RX ADMIN — METOCLOPRAMIDE HYDROCHLORIDE 10 MG: 5 INJECTION INTRAMUSCULAR; INTRAVENOUS at 00:07

## 2021-08-26 RX ADMIN — MORPHINE SULFATE 4 MG: 4 INJECTION, SOLUTION INTRAMUSCULAR; INTRAVENOUS at 00:07

## 2021-08-26 RX ADMIN — PROCHLORPERAZINE MALEATE 10 MG: 10 TABLET ORAL at 03:51

## 2021-08-26 RX ADMIN — DIPHENHYDRAMINE HYDROCHLORIDE 25 MG: 50 INJECTION INTRAMUSCULAR; INTRAVENOUS at 00:07
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
## 2021-09-07 ENCOUNTER — TRANSFERRED RECORDS (OUTPATIENT)
Dept: HEALTH INFORMATION MANAGEMENT | Facility: CLINIC | Age: 46
End: 2021-09-07

## 2021-09-07 ENCOUNTER — MEDICAL CORRESPONDENCE (OUTPATIENT)
Dept: HEALTH INFORMATION MANAGEMENT | Facility: CLINIC | Age: 46
End: 2021-09-07

## 2022-12-01 ENCOUNTER — MEDICAL CORRESPONDENCE (OUTPATIENT)
Dept: HEALTH INFORMATION MANAGEMENT | Facility: CLINIC | Age: 47
End: 2022-12-01

## 2022-12-01 ENCOUNTER — TRANSFERRED RECORDS (OUTPATIENT)
Dept: HEALTH INFORMATION MANAGEMENT | Facility: CLINIC | Age: 47
End: 2022-12-01

## 2022-12-01 LAB
ALT SERPL-CCNC: 25 U/L (ref 6–29)
AST SERPL-CCNC: 14 U/L (ref 10–35)
CREATININE (EXTERNAL): 0.65 MG/DL (ref 0.5–0.99)
GFR ESTIMATED (EXTERNAL): 109 ML/MIN/1.73M2
GLUCOSE (EXTERNAL): 223 MG/DL (ref 65–99)
HBA1C MFR BLD: 9 %
POTASSIUM (EXTERNAL): 4.2 MMOL/L (ref 3.5–5.3)
TSH SERPL-ACNC: 0.93 MIU/L (ref 0.4–4.5)

## 2022-12-06 ENCOUNTER — TRANSCRIBE ORDERS (OUTPATIENT)
Dept: OTHER | Age: 47
End: 2022-12-06

## 2022-12-06 DIAGNOSIS — R42 DIZZINESS: Primary | ICD-10-CM

## 2022-12-12 ENCOUNTER — APPOINTMENT (OUTPATIENT)
Dept: RADIOLOGY | Facility: CLINIC | Age: 47
End: 2022-12-12
Attending: EMERGENCY MEDICINE
Payer: COMMERCIAL

## 2022-12-12 ENCOUNTER — APPOINTMENT (OUTPATIENT)
Dept: ULTRASOUND IMAGING | Facility: CLINIC | Age: 47
End: 2022-12-12
Attending: EMERGENCY MEDICINE
Payer: COMMERCIAL

## 2022-12-12 ENCOUNTER — HOSPITAL ENCOUNTER (EMERGENCY)
Facility: CLINIC | Age: 47
Discharge: HOME | End: 2022-12-13
Attending: EMERGENCY MEDICINE | Admitting: EMERGENCY MEDICINE
Payer: COMMERCIAL

## 2022-12-12 DIAGNOSIS — S93.402A SPRAIN OF LEFT ANKLE, UNSPECIFIED LIGAMENT, INITIAL ENCOUNTER: ICD-10-CM

## 2022-12-12 LAB
BASOPHILS # BLD AUTO: 0.1 10E3/UL (ref 0–0.2)
BASOPHILS NFR BLD AUTO: 1 %
EOSINOPHIL # BLD AUTO: 0.2 10E3/UL (ref 0–0.7)
EOSINOPHIL NFR BLD AUTO: 2 %
ERYTHROCYTE [DISTWIDTH] IN BLOOD BY AUTOMATED COUNT: 12.5 % (ref 10–15)
HCT VFR BLD AUTO: 39.4 % (ref 35–47)
HGB BLD-MCNC: 13.3 G/DL (ref 11.7–15.7)
IMM GRANULOCYTES # BLD: 0 10E3/UL
IMM GRANULOCYTES NFR BLD: 1 %
LYMPHOCYTES # BLD AUTO: 3.3 10E3/UL (ref 0.8–5.3)
LYMPHOCYTES NFR BLD AUTO: 38 %
MCH RBC QN AUTO: 28.9 PG (ref 26.5–33)
MCHC RBC AUTO-ENTMCNC: 33.8 G/DL (ref 31.5–36.5)
MCV RBC AUTO: 86 FL (ref 78–100)
MONOCYTES # BLD AUTO: 0.7 10E3/UL (ref 0–1.3)
MONOCYTES NFR BLD AUTO: 8 %
NEUTROPHILS # BLD AUTO: 4.4 10E3/UL (ref 1.6–8.3)
NEUTROPHILS NFR BLD AUTO: 50 %
NRBC # BLD AUTO: 0 10E3/UL
NRBC BLD AUTO-RTO: 0 /100
PLATELET # BLD AUTO: 363 10E3/UL (ref 150–450)
RBC # BLD AUTO: 4.6 10E6/UL (ref 3.8–5.2)
WBC # BLD AUTO: 8.7 10E3/UL (ref 4–11)

## 2022-12-12 PROCEDURE — 85025 COMPLETE CBC W/AUTO DIFF WBC: CPT | Performed by: EMERGENCY MEDICINE

## 2022-12-12 PROCEDURE — 36415 COLL VENOUS BLD VENIPUNCTURE: CPT | Performed by: EMERGENCY MEDICINE

## 2022-12-12 PROCEDURE — 96374 THER/PROPH/DIAG INJ IV PUSH: CPT

## 2022-12-12 PROCEDURE — 73610 X-RAY EXAM OF ANKLE: CPT | Mod: LT

## 2022-12-12 PROCEDURE — 73630 X-RAY EXAM OF FOOT: CPT | Mod: LT

## 2022-12-12 PROCEDURE — 99285 EMERGENCY DEPT VISIT HI MDM: CPT | Mod: 25

## 2022-12-12 PROCEDURE — 93971 EXTREMITY STUDY: CPT | Mod: LT

## 2022-12-12 PROCEDURE — 80048 BASIC METABOLIC PNL TOTAL CA: CPT | Performed by: EMERGENCY MEDICINE

## 2022-12-12 RX ORDER — ACETAMINOPHEN 325 MG/1
650 TABLET ORAL ONCE
Status: COMPLETED | OUTPATIENT
Start: 2022-12-12 | End: 2022-12-13

## 2022-12-12 ASSESSMENT — ENCOUNTER SYMPTOMS
ARTHRALGIAS: 1
ABDOMINAL PAIN: 0
MYALGIAS: 1

## 2022-12-12 ASSESSMENT — ACTIVITIES OF DAILY LIVING (ADL): ADLS_ACUITY_SCORE: 35

## 2022-12-13 VITALS
RESPIRATION RATE: 18 BRPM | TEMPERATURE: 98.1 F | HEART RATE: 84 BPM | HEIGHT: 67 IN | BODY MASS INDEX: 40.81 KG/M2 | DIASTOLIC BLOOD PRESSURE: 89 MMHG | OXYGEN SATURATION: 95 % | SYSTOLIC BLOOD PRESSURE: 159 MMHG | WEIGHT: 260 LBS

## 2022-12-13 LAB
ANION GAP SERPL CALCULATED.3IONS-SCNC: 11 MMOL/L (ref 5–18)
APTT PPP: 24 SECONDS (ref 22–38)
BUN SERPL-MCNC: 11 MG/DL (ref 8–22)
CALCIUM SERPL-MCNC: 9.4 MG/DL (ref 8.5–10.5)
CHLORIDE BLD-SCNC: 103 MMOL/L (ref 98–107)
CO2 SERPL-SCNC: 23 MMOL/L (ref 22–31)
CREAT SERPL-MCNC: 0.88 MG/DL (ref 0.6–1.1)
GFR SERPL CREATININE-BSD FRML MDRD: 81 ML/MIN/1.73M2
GLUCOSE BLD-MCNC: 189 MG/DL (ref 70–125)
INR PPP: 0.99 (ref 0.85–1.15)
POTASSIUM BLD-SCNC: 3.9 MMOL/L (ref 3.5–5)
SODIUM SERPL-SCNC: 137 MMOL/L (ref 136–145)

## 2022-12-13 PROCEDURE — 85730 THROMBOPLASTIN TIME PARTIAL: CPT | Performed by: EMERGENCY MEDICINE

## 2022-12-13 PROCEDURE — 250N000011 HC RX IP 250 OP 636: Performed by: EMERGENCY MEDICINE

## 2022-12-13 PROCEDURE — 36415 COLL VENOUS BLD VENIPUNCTURE: CPT | Performed by: EMERGENCY MEDICINE

## 2022-12-13 PROCEDURE — 85610 PROTHROMBIN TIME: CPT | Performed by: EMERGENCY MEDICINE

## 2022-12-13 RX ORDER — KETOROLAC TROMETHAMINE 15 MG/ML
15 INJECTION, SOLUTION INTRAMUSCULAR; INTRAVENOUS ONCE
Status: COMPLETED | OUTPATIENT
Start: 2022-12-13 | End: 2022-12-13

## 2022-12-13 RX ADMIN — KETOROLAC TROMETHAMINE 15 MG: 15 INJECTION, SOLUTION INTRAMUSCULAR; INTRAVENOUS at 00:49

## 2022-12-13 ASSESSMENT — ACTIVITIES OF DAILY LIVING (ADL): ADLS_ACUITY_SCORE: 35
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
## 2022-12-13 NOTE — ED TRIAGE NOTES
"Pt presents to the ED with c/o left foot/ ankle pain for the past 4 days. Pt states left foot has a \"lump\" and is more red and swollen.       "

## 2022-12-13 NOTE — ED NOTES
EMERGENCY DEPARTMENT SIGN OUT NOTE        ED COURSE AND MEDICAL DECISION MAKING  Patient was signed out to me by Dr Qasim Hewitt at 11:38 PM  1:11 AM I rechecked the patient and updated them on laboratory and imaging results.    In brief, Alla Good is a 47 year old female who initially presented left ankle pain.  Imaging performed by the previous provider shows no evidence of fracture or DVT.    At time of sign out, disposition was pending lab testing.  If lab testing negative plan from the previous provider was for discharge to home.  There is no evidence of acute process on lab testing, and at this time we will discharge as originally planned by Dr. Hewitt.  Patient was comfortable with this plan.  Return precautions discussed.    FINAL IMPRESSION    1. Sprain of left ankle, unspecified ligament, initial encounter        ED MEDS  Medications   acetaminophen (TYLENOL) tablet 650 mg (650 mg Oral Not Given 12/13/22 0016)   ketorolac (TORADOL) injection 15 mg (15 mg Intravenous Given 12/13/22 0049)       LAB  Labs Ordered and Resulted from Time of ED Arrival to Time of ED Departure   BASIC METABOLIC PANEL - Abnormal       Result Value    Sodium 137      Potassium 3.9      Chloride 103      Carbon Dioxide (CO2) 23      Anion Gap 11      Urea Nitrogen 11      Creatinine 0.88      Calcium 9.4      Glucose 189 (*)     GFR Estimate 81     INR - Normal    INR 0.99     PARTIAL THROMBOPLASTIN TIME - Normal    aPTT 24     CBC WITH PLATELETS AND DIFFERENTIAL    WBC Count 8.7      RBC Count 4.60      Hemoglobin 13.3      Hematocrit 39.4      MCV 86      MCH 28.9      MCHC 33.8      RDW 12.5      Platelet Count 363      % Neutrophils 50      % Lymphocytes 38      % Monocytes 8      % Eosinophils 2      % Basophils 1      % Immature Granulocytes 1      NRBCs per 100 WBC 0      Absolute Neutrophils 4.4      Absolute Lymphocytes 3.3      Absolute Monocytes 0.7      Absolute Eosinophils 0.2      Absolute Basophils 0.1       Absolute Immature Granulocytes 0.0      Absolute NRBCs 0.0         RADIOLOGY    Foot XR, G/E 3 views, left   Final Result   IMPRESSION:    1. No visualized acute fracture, malalignment or other acute osseous abnormality of the left foot.   2. Mild degenerative changes in the left first metatarsophalangeal joint and mid foot.   3. Plantar calcaneal spur.      XR Ankle Left G/E 3 Views   Final Result   IMPRESSION: No acute finding such as fracture dislocation. Ankle mortise intact. Hypertrophic changes most marked involving the caliber kidney is at the plantar region.      US Lower Extremity Venous Duplex Left   Final Result   IMPRESSION: No evidence of thrombus in the major veins of the left lower extremity.           DISCHARGE MEDS  New Prescriptions    No medications on file       Sunday REIDO.  [facility] EMERGENCY ROOM  [address] [id number] [phone]     Sunday Arevalo,   12/13/22 0130

## 2023-01-07 ENCOUNTER — APPOINTMENT (OUTPATIENT)
Dept: MRI IMAGING | Facility: CLINIC | Age: 48
End: 2023-01-07
Attending: EMERGENCY MEDICINE
Payer: COMMERCIAL

## 2023-01-07 ENCOUNTER — HOSPITAL ENCOUNTER (EMERGENCY)
Facility: CLINIC | Age: 48
Discharge: HOME | End: 2023-01-07
Attending: EMERGENCY MEDICINE | Admitting: EMERGENCY MEDICINE
Payer: COMMERCIAL

## 2023-01-07 VITALS
OXYGEN SATURATION: 98 % | WEIGHT: 265 LBS | DIASTOLIC BLOOD PRESSURE: 74 MMHG | HEIGHT: 67 IN | TEMPERATURE: 97.9 F | SYSTOLIC BLOOD PRESSURE: 115 MMHG | HEART RATE: 79 BPM | RESPIRATION RATE: 16 BRPM | BODY MASS INDEX: 41.59 KG/M2

## 2023-01-07 DIAGNOSIS — M79.672 LEFT FOOT PAIN: ICD-10-CM

## 2023-01-07 PROCEDURE — 250N000013 HC RX MED GY IP 250 OP 250 PS 637: Performed by: EMERGENCY MEDICINE

## 2023-01-07 PROCEDURE — 73721 MRI JNT OF LWR EXTRE W/O DYE: CPT | Mod: LT

## 2023-01-07 PROCEDURE — 99284 EMERGENCY DEPT VISIT MOD MDM: CPT | Mod: 25

## 2023-01-07 RX ORDER — OXYCODONE HYDROCHLORIDE 5 MG/1
5 TABLET ORAL ONCE
Status: COMPLETED | OUTPATIENT
Start: 2023-01-07 | End: 2023-01-07

## 2023-01-07 RX ORDER — LORAZEPAM 1 MG/1
1 TABLET ORAL ONCE
Status: COMPLETED | OUTPATIENT
Start: 2023-01-07 | End: 2023-01-07

## 2023-01-07 RX ORDER — NAPROXEN 500 MG/1
500 TABLET ORAL 2 TIMES DAILY WITH MEALS
Qty: 14 TABLET | Refills: 0 | Status: SHIPPED | OUTPATIENT
Start: 2023-01-07 | End: 2023-01-14

## 2023-01-07 RX ADMIN — LORAZEPAM 1 MG: 1 TABLET ORAL at 20:44

## 2023-01-07 RX ADMIN — OXYCODONE HYDROCHLORIDE 5 MG: 5 TABLET ORAL at 23:00

## 2023-01-07 RX ADMIN — OXYCODONE HYDROCHLORIDE 5 MG: 5 TABLET ORAL at 19:28

## 2023-01-07 ASSESSMENT — ACTIVITIES OF DAILY LIVING (ADL)
ADLS_ACUITY_SCORE: 33
ADLS_ACUITY_SCORE: 35
ADLS_ACUITY_SCORE: 35

## 2023-01-07 NOTE — Clinical Note
Alla Good was seen and treated in our emergency department on 1/7/2023.  She may return to work on 01/09/2023.       If you have any questions or concerns, please don't hesitate to call.      Srinivas Damian MD

## 2023-01-08 NOTE — DISCHARGE INSTRUCTIONS
Take the naproxen as prescribed, do not take other NSAIDs such as ibuprofen or Motrin while taking naproxen.  Follow-up with podiatry in the next 5 to 7 days.  Please return for any worsening or more concerning symptoms.
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
## 2023-01-08 NOTE — ED TRIAGE NOTES
Pt reports being seen here for L foot pain recently. Was told to follow up and has appointment for next month. States pain has become worse since being seen and is having difficulty bearing weight.     Triage Assessment     Row Name 01/07/23 2575       Triage Assessment (Adult)    Airway WDL WDL       Respiratory WDL    Respiratory WDL WDL       Skin Circulation/Temperature WDL    Skin Circulation/Temperature WDL WDL       Cardiac WDL    Cardiac WDL WDL       Peripheral/Neurovascular WDL    Peripheral Neurovascular WDL WDL       Cognitive/Neuro/Behavioral WDL    Cognitive/Neuro/Behavioral WDL WDL

## 2023-02-14 ENCOUNTER — HOSPITAL ENCOUNTER (EMERGENCY)
Facility: CLINIC | Age: 48
Discharge: HOME | End: 2023-02-14
Attending: STUDENT IN AN ORGANIZED HEALTH CARE EDUCATION/TRAINING PROGRAM | Admitting: STUDENT IN AN ORGANIZED HEALTH CARE EDUCATION/TRAINING PROGRAM
Payer: COMMERCIAL

## 2023-02-14 ENCOUNTER — NURSE TRIAGE (OUTPATIENT)
Dept: NURSING | Facility: CLINIC | Age: 48
End: 2023-02-14
Payer: COMMERCIAL

## 2023-02-14 ENCOUNTER — APPOINTMENT (OUTPATIENT)
Dept: RADIOLOGY | Facility: CLINIC | Age: 48
End: 2023-02-14
Attending: STUDENT IN AN ORGANIZED HEALTH CARE EDUCATION/TRAINING PROGRAM
Payer: COMMERCIAL

## 2023-02-14 VITALS
TEMPERATURE: 97.8 F | SYSTOLIC BLOOD PRESSURE: 154 MMHG | WEIGHT: 266 LBS | DIASTOLIC BLOOD PRESSURE: 69 MMHG | HEART RATE: 81 BPM | HEIGHT: 67 IN | RESPIRATION RATE: 24 BRPM | OXYGEN SATURATION: 98 % | BODY MASS INDEX: 41.75 KG/M2

## 2023-02-14 DIAGNOSIS — R06.02 SHORTNESS OF BREATH: Primary | ICD-10-CM

## 2023-02-14 DIAGNOSIS — U07.1 COVID-19: ICD-10-CM

## 2023-02-14 LAB
ANION GAP SERPL CALCULATED.3IONS-SCNC: 11 MMOL/L (ref 5–18)
ATRIAL RATE - MUSE: 102 BPM
BASOPHILS # BLD AUTO: 0 10E3/UL (ref 0–0.2)
BASOPHILS NFR BLD AUTO: 0 %
BUN SERPL-MCNC: 11 MG/DL (ref 8–22)
CALCIUM SERPL-MCNC: 9.4 MG/DL (ref 8.5–10.5)
CHLORIDE BLD-SCNC: 101 MMOL/L (ref 98–107)
CO2 SERPL-SCNC: 25 MMOL/L (ref 22–31)
CREAT SERPL-MCNC: 0.8 MG/DL (ref 0.6–1.1)
D DIMER PPP FEU-MCNC: 0.43 UG/ML FEU (ref 0–0.5)
DIASTOLIC BLOOD PRESSURE - MUSE: NORMAL MMHG
EOSINOPHIL # BLD AUTO: 0.1 10E3/UL (ref 0–0.7)
EOSINOPHIL NFR BLD AUTO: 2 %
ERYTHROCYTE [DISTWIDTH] IN BLOOD BY AUTOMATED COUNT: 12.1 % (ref 10–15)
GFR SERPL CREATININE-BSD FRML MDRD: 90 ML/MIN/1.73M2
GLUCOSE BLD-MCNC: 263 MG/DL (ref 70–125)
HCT VFR BLD AUTO: 44.9 % (ref 35–47)
HGB BLD-MCNC: 15 G/DL (ref 11.7–15.7)
IMM GRANULOCYTES # BLD: 0.1 10E3/UL
IMM GRANULOCYTES NFR BLD: 1 %
INTERPRETATION ECG - MUSE: NORMAL
LYMPHOCYTES # BLD AUTO: 2.9 10E3/UL (ref 0.8–5.3)
LYMPHOCYTES NFR BLD AUTO: 38 %
MCH RBC QN AUTO: 28.9 PG (ref 26.5–33)
MCHC RBC AUTO-ENTMCNC: 33.4 G/DL (ref 31.5–36.5)
MCV RBC AUTO: 87 FL (ref 78–100)
MONOCYTES # BLD AUTO: 0.5 10E3/UL (ref 0–1.3)
MONOCYTES NFR BLD AUTO: 6 %
NEUTROPHILS # BLD AUTO: 3.9 10E3/UL (ref 1.6–8.3)
NEUTROPHILS NFR BLD AUTO: 53 %
NRBC # BLD AUTO: 0 10E3/UL
NRBC BLD AUTO-RTO: 0 /100
P AXIS - MUSE: 46 DEGREES
PLATELET # BLD AUTO: 326 10E3/UL (ref 150–450)
POTASSIUM BLD-SCNC: 4 MMOL/L (ref 3.5–5)
PR INTERVAL - MUSE: 158 MS
QRS DURATION - MUSE: 76 MS
QT - MUSE: 338 MS
QTC - MUSE: 440 MS
R AXIS - MUSE: 13 DEGREES
RBC # BLD AUTO: 5.19 10E6/UL (ref 3.8–5.2)
SODIUM SERPL-SCNC: 137 MMOL/L (ref 136–145)
SYSTOLIC BLOOD PRESSURE - MUSE: NORMAL MMHG
T AXIS - MUSE: 16 DEGREES
VENTRICULAR RATE- MUSE: 102 BPM
WBC # BLD AUTO: 7.5 10E3/UL (ref 4–11)

## 2023-02-14 PROCEDURE — 96375 TX/PRO/DX INJ NEW DRUG ADDON: CPT

## 2023-02-14 PROCEDURE — 85004 AUTOMATED DIFF WBC COUNT: CPT | Performed by: STUDENT IN AN ORGANIZED HEALTH CARE EDUCATION/TRAINING PROGRAM

## 2023-02-14 PROCEDURE — 99285 EMERGENCY DEPT VISIT HI MDM: CPT | Mod: 25

## 2023-02-14 PROCEDURE — 85379 FIBRIN DEGRADATION QUANT: CPT | Performed by: STUDENT IN AN ORGANIZED HEALTH CARE EDUCATION/TRAINING PROGRAM

## 2023-02-14 PROCEDURE — 93005 ELECTROCARDIOGRAM TRACING: CPT | Performed by: STUDENT IN AN ORGANIZED HEALTH CARE EDUCATION/TRAINING PROGRAM

## 2023-02-14 PROCEDURE — 258N000003 HC RX IP 258 OP 636: Performed by: STUDENT IN AN ORGANIZED HEALTH CARE EDUCATION/TRAINING PROGRAM

## 2023-02-14 PROCEDURE — 36415 COLL VENOUS BLD VENIPUNCTURE: CPT | Performed by: STUDENT IN AN ORGANIZED HEALTH CARE EDUCATION/TRAINING PROGRAM

## 2023-02-14 PROCEDURE — 71046 X-RAY EXAM CHEST 2 VIEWS: CPT

## 2023-02-14 PROCEDURE — 80048 BASIC METABOLIC PNL TOTAL CA: CPT | Performed by: STUDENT IN AN ORGANIZED HEALTH CARE EDUCATION/TRAINING PROGRAM

## 2023-02-14 PROCEDURE — 93005 ELECTROCARDIOGRAM TRACING: CPT | Performed by: EMERGENCY MEDICINE

## 2023-02-14 PROCEDURE — 96374 THER/PROPH/DIAG INJ IV PUSH: CPT

## 2023-02-14 PROCEDURE — 250N000011 HC RX IP 250 OP 636: Performed by: STUDENT IN AN ORGANIZED HEALTH CARE EDUCATION/TRAINING PROGRAM

## 2023-02-14 PROCEDURE — 96361 HYDRATE IV INFUSION ADD-ON: CPT

## 2023-02-14 RX ORDER — DIPHENHYDRAMINE HYDROCHLORIDE 50 MG/ML
25 INJECTION INTRAMUSCULAR; INTRAVENOUS ONCE
Status: COMPLETED | OUTPATIENT
Start: 2023-02-14 | End: 2023-02-14

## 2023-02-14 RX ORDER — DEXAMETHASONE SODIUM PHOSPHATE 10 MG/ML
10 INJECTION, SOLUTION INTRAMUSCULAR; INTRAVENOUS ONCE
Status: COMPLETED | OUTPATIENT
Start: 2023-02-14 | End: 2023-02-14

## 2023-02-14 RX ADMIN — DEXAMETHASONE SODIUM PHOSPHATE 10 MG: 10 INJECTION, SOLUTION INTRAMUSCULAR; INTRAVENOUS at 21:05

## 2023-02-14 RX ADMIN — DIPHENHYDRAMINE HYDROCHLORIDE 25 MG: 50 INJECTION INTRAMUSCULAR; INTRAVENOUS at 22:45

## 2023-02-14 RX ADMIN — SODIUM CHLORIDE 1000 ML: 9 INJECTION, SOLUTION INTRAVENOUS at 21:06

## 2023-02-14 ASSESSMENT — ACTIVITIES OF DAILY LIVING (ADL)
ADLS_ACUITY_SCORE: 35
ADLS_ACUITY_SCORE: 35

## 2023-02-14 NOTE — Clinical Note
Alla Good was seen and treated in our emergency department on 2/14/2023.  She may return to work on 02/21/2023.       If you have any questions or concerns, please don't hesitate to call.      Marques James MD
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
## 2023-02-15 NOTE — TELEPHONE ENCOUNTER
Nurse Triage SBAR    Situation: Difficulty breathing.     Background: Patient calling. Covid positive started on mol    Assessment: Difficulty breathing. Coughing up blood. Fevers. Inhaler is not working. Vomiting. As the patient talked she was getting more and more out of breath.     Protocol Recommended Disposition: Call 911    Recommendation: According to the protocol, Patient should Call 911. Advised Patient to Call 911. Care advice given. Patient verbalizes understanding but stated she may have someone drive her into the ED, RN advised that if she changes her mind to call 911.       Annita Barroso RN Nursing Advisor 2/14/2023 6:53 PM     Reason for Disposition    SEVERE difficulty breathing (e.g., struggling for each breath, speaks in single words)    Protocols used: CORONAVIRUS (COVID-19) DIAGNOSED OR [id number]

## 2023-02-15 NOTE — ED TRIAGE NOTES
Pt arrives to ED with c/o shortness of breath and chest pain that got worse today. Pt tested positive for covid on 2/9. Still endorses to cough. Pt is on lagevrio for covid.      Triage Assessment     Row Name 02/14/23 1922       Triage Assessment (Adult)    Airway WDL WDL       Respiratory WDL    Respiratory WDL X;rhythm/pattern;cough    Rhythm/Pattern, Respiratory shortness of breath    Cough Frequency infrequent    Cough Type loose;productive       Skin Circulation/Temperature WDL    Skin Circulation/Temperature WDL WDL       Cardiac WDL    Cardiac WDL X;chest pain       Chest Pain Assessment    Chest Pain Location midsternal       Peripheral/Neurovascular WDL    Peripheral Neurovascular WDL WDL       Cognitive/Neuro/Behavioral WDL    Cognitive/Neuro/Behavioral WDL WDL

## 2023-06-03 ENCOUNTER — HOSPITAL ENCOUNTER (EMERGENCY)
Facility: CLINIC | Age: 48
Discharge: HOME | End: 2023-06-03
Attending: EMERGENCY MEDICINE | Admitting: EMERGENCY MEDICINE
Payer: COMMERCIAL

## 2023-06-03 VITALS
BODY MASS INDEX: 39.24 KG/M2 | WEIGHT: 250 LBS | OXYGEN SATURATION: 96 % | RESPIRATION RATE: 18 BRPM | HEART RATE: 105 BPM | DIASTOLIC BLOOD PRESSURE: 87 MMHG | SYSTOLIC BLOOD PRESSURE: 147 MMHG | HEIGHT: 67 IN | TEMPERATURE: 98 F

## 2023-06-03 DIAGNOSIS — S61.212A LACERATION OF RIGHT MIDDLE FINGER WITHOUT FOREIGN BODY WITHOUT DAMAGE TO NAIL, INITIAL ENCOUNTER: ICD-10-CM

## 2023-06-03 PROCEDURE — 250N000011 HC RX IP 250 OP 636: Performed by: EMERGENCY MEDICINE

## 2023-06-03 PROCEDURE — 99284 EMERGENCY DEPT VISIT MOD MDM: CPT | Mod: 25

## 2023-06-03 PROCEDURE — 12001 RPR S/N/AX/GEN/TRNK 2.5CM/<: CPT

## 2023-06-03 PROCEDURE — 29130 APPL FINGER SPLINT STATIC: CPT | Mod: F7

## 2023-06-03 PROCEDURE — 90715 TDAP VACCINE 7 YRS/> IM: CPT | Performed by: EMERGENCY MEDICINE

## 2023-06-03 PROCEDURE — 90471 IMMUNIZATION ADMIN: CPT | Performed by: EMERGENCY MEDICINE

## 2023-06-03 RX ADMIN — CLOSTRIDIUM TETANI TOXOID ANTIGEN (FORMALDEHYDE INACTIVATED), CORYNEBACTERIUM DIPHTHERIAE TOXOID ANTIGEN (FORMALDEHYDE INACTIVATED), BORDETELLA PERTUSSIS TOXOID ANTIGEN (GLUTARALDEHYDE INACTIVATED), BORDETELLA PERTUSSIS FILAMENTOUS HEMAGGLUTININ ANTIGEN (FORMALDEHYDE INACTIVATED), BORDETELLA PERTUSSIS PERTACTIN ANTIGEN, AND BORDETELLA PERTUSSIS FIMBRIAE 2/3 ANTIGEN 0.5 ML: 5; 2; 2.5; 5; 3; 5 INJECTION, SUSPENSION INTRAMUSCULAR at 22:32

## 2023-06-04 NOTE — DISCHARGE INSTRUCTIONS
Four stitches are dissolvable and do not need to be removed. Keep the area clean and dry for 48 hours. Then keep the area clean and covered until it fully heals.

## 2023-06-04 NOTE — ED TRIAGE NOTES
Pt presents to the ED with c/o laceration to right middle finger. Pt cut finger around 0630 today.      Triage Assessment     Row Name 06/03/23 4489       Triage Assessment (Adult)    Airway WDL WDL       Respiratory WDL    Respiratory WDL WDL       Peripheral/Neurovascular WDL    Peripheral Neurovascular WDL WDL       Cognitive/Neuro/Behavioral WDL    Cognitive/Neuro/Behavioral WDL WDL
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
## 2023-10-20 ENCOUNTER — APPOINTMENT (OUTPATIENT)
Dept: RADIOLOGY | Facility: CLINIC | Age: 48
End: 2023-10-20
Payer: COMMERCIAL

## 2023-10-20 ENCOUNTER — HOSPITAL ENCOUNTER (EMERGENCY)
Facility: CLINIC | Age: 48
Discharge: HOME | End: 2023-10-20
Admitting: EMERGENCY MEDICINE
Payer: COMMERCIAL

## 2023-10-20 VITALS
WEIGHT: 230 LBS | BODY MASS INDEX: 36.1 KG/M2 | OXYGEN SATURATION: 99 % | TEMPERATURE: 98.4 F | RESPIRATION RATE: 18 BRPM | SYSTOLIC BLOOD PRESSURE: 125 MMHG | HEART RATE: 88 BPM | DIASTOLIC BLOOD PRESSURE: 77 MMHG | HEIGHT: 67 IN

## 2023-10-20 DIAGNOSIS — M25.572 PAIN IN JOINT, ANKLE AND FOOT, LEFT: ICD-10-CM

## 2023-10-20 DIAGNOSIS — S89.92XA KNEE INJURY, LEFT, INITIAL ENCOUNTER: ICD-10-CM

## 2023-10-20 PROCEDURE — 250N000013 HC RX MED GY IP 250 OP 250 PS 637: Performed by: PHYSICIAN ASSISTANT

## 2023-10-20 PROCEDURE — 73562 X-RAY EXAM OF KNEE 3: CPT | Mod: LT

## 2023-10-20 PROCEDURE — 250N000011 HC RX IP 250 OP 636: Performed by: PHYSICIAN ASSISTANT

## 2023-10-20 PROCEDURE — 73610 X-RAY EXAM OF ANKLE: CPT | Mod: LT

## 2023-10-20 PROCEDURE — 99284 EMERGENCY DEPT VISIT MOD MDM: CPT | Mod: 25

## 2023-10-20 PROCEDURE — 96372 THER/PROPH/DIAG INJ SC/IM: CPT | Performed by: PHYSICIAN ASSISTANT

## 2023-10-20 RX ORDER — OXYCODONE HYDROCHLORIDE 5 MG/1
5 TABLET ORAL EVERY 6 HOURS PRN
Qty: 6 TABLET | Refills: 0 | Status: SHIPPED | OUTPATIENT
Start: 2023-10-20 | End: 2023-10-23

## 2023-10-20 RX ORDER — IBUPROFEN 600 MG/1
600 TABLET, FILM COATED ORAL EVERY 6 HOURS PRN
Qty: 2 TABLET | Refills: 0 | Status: SHIPPED | OUTPATIENT
Start: 2023-10-20 | End: 2023-10-20

## 2023-10-20 RX ORDER — OXYCODONE HYDROCHLORIDE 5 MG/1
5 TABLET ORAL ONCE
Status: COMPLETED | OUTPATIENT
Start: 2023-10-20 | End: 2023-10-20

## 2023-10-20 RX ORDER — HYDROMORPHONE HYDROCHLORIDE 1 MG/ML
0.5 INJECTION, SOLUTION INTRAMUSCULAR; INTRAVENOUS; SUBCUTANEOUS ONCE
Status: COMPLETED | OUTPATIENT
Start: 2023-10-20 | End: 2023-10-20

## 2023-10-20 RX ADMIN — HYDROMORPHONE HYDROCHLORIDE 0.5 MG: 1 INJECTION, SOLUTION INTRAMUSCULAR; INTRAVENOUS; SUBCUTANEOUS at 12:17

## 2023-10-20 RX ADMIN — OXYCODONE HYDROCHLORIDE 5 MG: 5 TABLET ORAL at 12:17

## 2023-10-20 ASSESSMENT — ACTIVITIES OF DAILY LIVING (ADL): ADLS_ACUITY_SCORE: 35

## 2023-10-20 NOTE — ED TRIAGE NOTES
Arrives to ED with c/o L knee pain that began last night. Reports was running last night when began to feel L knee pain. Reports has arthritis and possible bursitis to L knee. Recently finished steriods for L knee pain. No deformity noted. L knee slightly swollen. Painful to touch. Mild numbness to L foot.      Triage Assessment (Adult)       Row Name 10/20/23 1005          Triage Assessment    Airway WDL WDL        Respiratory WDL    Respiratory WDL WDL        Skin Circulation/Temperature WDL    Skin Circulation/Temperature WDL WDL        Cardiac WDL    Cardiac WDL WDL        Peripheral/Neurovascular WDL    Peripheral Neurovascular WDL WDL        Cognitive/Neuro/Behavioral WDL    Cognitive/Neuro/Behavioral WDL WDL

## 2023-10-20 NOTE — Clinical Note
Alla Good was seen and treated in our emergency department on 10/20/2023.    Please excuse patient from work today and at least through 10/24/23. Further work restrictions based on follow up     Sincerely,     [facility] Emergency Room

## 2023-10-20 NOTE — Clinical Note
Alla Good was seen and treated in our emergency department on 10/20/2023.  She may return to work on 10/27/2023.  Further instructions based on follow up     If you have any questions or concerns, please don't hesitate to call.      Ely Meredith PA-C

## 2023-10-20 NOTE — DISCHARGE INSTRUCTIONS
Your x-rays are negative here in the emergency department.  However there may be more ligamentous or meniscus injury.  Orthopedics is going to be a important neck step and it sounds like you have a visit already scheduled this afternoon so we are discharging you home with the brace and crutches to follow-up there.    We have prescribed a small amount of pain medication.  Would recommend using this only as needed for severe pain.  Otherwise would recommend Tylenol or ibuprofen, ice and elevation.
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
## 2024-07-27 NOTE — LETTER
[facility]  [address] [facility] [address]  Phone: [phone]    June 24, 2018        Alla Good  [address] [place] [address]          To whom it may concern:    RE: Alla Good    Patient was seen and treated today at our clinic and missed work. Please excuse her from work today (6/24) as well as on 6/19 due to her illness.     Please contact me for questions or concerns.      Sincerely,        Stephen Dalal MD  
English

## 2024-11-10 ENCOUNTER — APPOINTMENT (OUTPATIENT)
Dept: CT IMAGING | Facility: CLINIC | Age: 49
End: 2024-11-10
Payer: COMMERCIAL

## 2024-11-10 ENCOUNTER — HOSPITAL ENCOUNTER (EMERGENCY)
Facility: CLINIC | Age: 49
Discharge: HOME | End: 2024-11-11
Payer: COMMERCIAL

## 2024-11-10 DIAGNOSIS — R73.9 HYPERGLYCEMIA: ICD-10-CM

## 2024-11-10 DIAGNOSIS — S09.90XA CLOSED HEAD INJURY, INITIAL ENCOUNTER: ICD-10-CM

## 2024-11-10 LAB — GLUCOSE BLDC GLUCOMTR-MCNC: 244 MG/DL (ref 70–99)

## 2024-11-10 PROCEDURE — 96372 THER/PROPH/DIAG INJ SC/IM: CPT

## 2024-11-10 PROCEDURE — 250N000011 HC RX IP 250 OP 636

## 2024-11-10 PROCEDURE — 99285 EMERGENCY DEPT VISIT HI MDM: CPT | Mod: 25

## 2024-11-10 PROCEDURE — 70450 CT HEAD/BRAIN W/O DYE: CPT

## 2024-11-10 PROCEDURE — 250N000013 HC RX MED GY IP 250 OP 250 PS 637

## 2024-11-10 PROCEDURE — 82962 GLUCOSE BLOOD TEST: CPT

## 2024-11-10 RX ORDER — ONDANSETRON 4 MG/1
4 TABLET, ORALLY DISINTEGRATING ORAL ONCE
Status: COMPLETED | OUTPATIENT
Start: 2024-11-10 | End: 2024-11-10

## 2024-11-10 RX ORDER — PROCHLORPERAZINE MALEATE 10 MG
10 TABLET ORAL ONCE
Status: DISCONTINUED | OUTPATIENT
Start: 2024-11-10 | End: 2024-11-10

## 2024-11-10 RX ORDER — ACETAMINOPHEN 325 MG/1
975 TABLET ORAL ONCE
Status: COMPLETED | OUTPATIENT
Start: 2024-11-10 | End: 2024-11-10

## 2024-11-10 RX ORDER — PROCHLORPERAZINE MALEATE 10 MG
10 TABLET ORAL ONCE
Status: COMPLETED | OUTPATIENT
Start: 2024-11-10 | End: 2024-11-10

## 2024-11-10 RX ORDER — KETOROLAC TROMETHAMINE 30 MG/ML
30 INJECTION, SOLUTION INTRAMUSCULAR; INTRAVENOUS ONCE
Status: COMPLETED | OUTPATIENT
Start: 2024-11-10 | End: 2024-11-10

## 2024-11-10 RX ADMIN — ACETAMINOPHEN 975 MG: 325 TABLET ORAL at 22:23

## 2024-11-10 RX ADMIN — KETOROLAC TROMETHAMINE 30 MG: 30 INJECTION, SOLUTION INTRAMUSCULAR at 23:43

## 2024-11-10 RX ADMIN — PROCHLORPERAZINE MALEATE 10 MG: 10 TABLET ORAL at 23:40

## 2024-11-10 RX ADMIN — ONDANSETRON 4 MG: 4 TABLET, ORALLY DISINTEGRATING ORAL at 22:23

## 2024-11-10 ASSESSMENT — COLUMBIA-SUICIDE SEVERITY RATING SCALE - C-SSRS
2. HAVE YOU ACTUALLY HAD ANY THOUGHTS OF KILLING YOURSELF IN THE PAST MONTH?: NO
6. HAVE YOU EVER DONE ANYTHING, STARTED TO DO ANYTHING, OR PREPARED TO DO ANYTHING TO END YOUR LIFE?: NO
1. IN THE PAST MONTH, HAVE YOU WISHED YOU WERE DEAD OR WISHED YOU COULD GO TO SLEEP AND NOT WAKE UP?: NO

## 2024-11-10 ASSESSMENT — ACTIVITIES OF DAILY LIVING (ADL): ADLS_ACUITY_SCORE: 0

## 2024-11-10 NOTE — Clinical Note
Alla Good was seen and treated in our emergency department on 11/10/2024.  She may return to work on 11/13/2024.       If you have any questions or concerns, please don't hesitate to call.      Lexa Moore MD

## 2024-11-10 NOTE — LETTER
3  November 11, 2024      To Whom It May Concern:      Alla Good was seen in our Emergency Department today, 11/11/24.  I expect her condition to improve over the next 3 days.  She may return to work/school when improved.    Sincerely,        Ericka Freitas RN

## 2024-11-11 VITALS
SYSTOLIC BLOOD PRESSURE: 135 MMHG | TEMPERATURE: 98 F | BODY MASS INDEX: 35.31 KG/M2 | HEIGHT: 67 IN | RESPIRATION RATE: 16 BRPM | HEART RATE: 75 BPM | WEIGHT: 225 LBS | OXYGEN SATURATION: 98 % | DIASTOLIC BLOOD PRESSURE: 72 MMHG

## 2024-11-11 RX ORDER — PROCHLORPERAZINE MALEATE 10 MG
10 TABLET ORAL EVERY 6 HOURS PRN
Qty: 20 TABLET | Refills: 0 | Status: SHIPPED | OUTPATIENT
Start: 2024-11-11

## 2024-11-11 NOTE — ED TRIAGE NOTES
Pt reports that she tripped over her dogs last evening and fell onto wood floor. Pt reports that she got up to use the bathroom during the night and noted to have headache pain. Pt reports that this morning around 7am, she started having emesis and nausea. Took tylenol arthritis 2-3 times today. Also c/o left wrist pain      Triage Assessment (Adult)       Row Name 11/10/24 9226          Triage Assessment    Airway WDL WDL        Respiratory WDL    Respiratory WDL WDL        Skin Circulation/Temperature WDL    Skin Circulation/Temperature WDL WDL        Cardiac WDL    Cardiac WDL WDL        Peripheral/Neurovascular WDL    Peripheral Neurovascular WDL WDL        Cognitive/Neuro/Behavioral WDL    Cognitive/Neuro/Behavioral WDL WDL

## 2024-11-11 NOTE — DISCHARGE INSTRUCTIONS
You have been evaluated in the Emergency Department today for your head injury. Your CT scan did not show signs of bleeds or fractures in your head.  However, bleeding may be delayed and you may need to return to the emergency department for further work-up if developing new or worsening symptoms.    Please schedule an appointment for follow up with your primary care physician as directed.    Return to the Emergency Department if you experience worsening or uncontrolled pain, vision changes, recurrent vomiting, difficulty with normal activities, abnormal behavior, difficulty walking, numbness, weakness, or any other concerning symptoms.    Thank you for choosing us for your care.
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
